# Patient Record
Sex: FEMALE | Race: WHITE | NOT HISPANIC OR LATINO | Employment: FULL TIME | URBAN - METROPOLITAN AREA
[De-identification: names, ages, dates, MRNs, and addresses within clinical notes are randomized per-mention and may not be internally consistent; named-entity substitution may affect disease eponyms.]

---

## 2017-05-25 DIAGNOSIS — Z01.812 PRE-PROCEDURAL LABORATORY EXAMINATION: ICD-10-CM

## 2017-05-25 LAB — HCG SERPL QL: NEGATIVE

## 2017-05-25 PROCEDURE — 84703 CHORIONIC GONADOTROPIN ASSAY: CPT

## 2017-05-25 PROCEDURE — 36415 COLL VENOUS BLD VENIPUNCTURE: CPT

## 2017-05-26 ENCOUNTER — HOSPITAL ENCOUNTER (OUTPATIENT)
Facility: MEDICAL CENTER | Age: 27
End: 2017-05-26
Attending: ORTHOPAEDIC SURGERY | Admitting: ORTHOPAEDIC SURGERY
Payer: COMMERCIAL

## 2017-05-26 VITALS
OXYGEN SATURATION: 98 % | HEIGHT: 62 IN | SYSTOLIC BLOOD PRESSURE: 119 MMHG | WEIGHT: 161.6 LBS | TEMPERATURE: 98.3 F | HEART RATE: 67 BPM | DIASTOLIC BLOOD PRESSURE: 67 MMHG | RESPIRATION RATE: 16 BRPM | BODY MASS INDEX: 29.74 KG/M2

## 2017-05-26 PROBLEM — G56.01 CARPAL TUNNEL SYNDROME ON RIGHT: Status: ACTIVE | Noted: 2017-05-26

## 2017-05-26 PROCEDURE — 160035 HCHG PACU - 1ST 60 MINS PHASE I: Performed by: ORTHOPAEDIC SURGERY

## 2017-05-26 PROCEDURE — 160009 HCHG ANES TIME/MIN: Performed by: ORTHOPAEDIC SURGERY

## 2017-05-26 PROCEDURE — 160036 HCHG PACU - EA ADDL 30 MINS PHASE I: Performed by: ORTHOPAEDIC SURGERY

## 2017-05-26 PROCEDURE — 500881 HCHG PACK, EXTREMITY: Performed by: ORTHOPAEDIC SURGERY

## 2017-05-26 PROCEDURE — A9270 NON-COVERED ITEM OR SERVICE: HCPCS

## 2017-05-26 PROCEDURE — 500761 HCHG KIT, CARPAL TUNNEL ENDOSCOPIC: Performed by: ORTHOPAEDIC SURGERY

## 2017-05-26 PROCEDURE — 501838 HCHG SUTURE GENERAL: Performed by: ORTHOPAEDIC SURGERY

## 2017-05-26 PROCEDURE — 160029 HCHG SURGERY MINUTES - 1ST 30 MINS LEVEL 4: Performed by: ORTHOPAEDIC SURGERY

## 2017-05-26 PROCEDURE — 501480 HCHG STOCKINETTE: Performed by: ORTHOPAEDIC SURGERY

## 2017-05-26 PROCEDURE — 160002 HCHG RECOVERY MINUTES (STAT): Performed by: ORTHOPAEDIC SURGERY

## 2017-05-26 PROCEDURE — 160041 HCHG SURGERY MINUTES - EA ADDL 1 MIN LEVEL 4: Performed by: ORTHOPAEDIC SURGERY

## 2017-05-26 PROCEDURE — 160048 HCHG OR STATISTICAL LEVEL 1-5: Performed by: ORTHOPAEDIC SURGERY

## 2017-05-26 PROCEDURE — 700102 HCHG RX REV CODE 250 W/ 637 OVERRIDE(OP)

## 2017-05-26 PROCEDURE — 700111 HCHG RX REV CODE 636 W/ 250 OVERRIDE (IP)

## 2017-05-26 PROCEDURE — 502240 HCHG MISC OR SUPPLY RC 0272: Performed by: ORTHOPAEDIC SURGERY

## 2017-05-26 PROCEDURE — 700101 HCHG RX REV CODE 250

## 2017-05-26 RX ORDER — OXYCODONE HCL 5 MG/5 ML
SOLUTION, ORAL ORAL
Status: COMPLETED
Start: 2017-05-26 | End: 2017-05-26

## 2017-05-26 RX ORDER — HALOPERIDOL 5 MG/ML
1 INJECTION INTRAMUSCULAR EVERY 6 HOURS PRN
Status: DISCONTINUED | OUTPATIENT
Start: 2017-05-26 | End: 2017-05-26 | Stop reason: HOSPADM

## 2017-05-26 RX ORDER — BUPIVACAINE HYDROCHLORIDE AND EPINEPHRINE 2.5; 5 MG/ML; UG/ML
INJECTION, SOLUTION EPIDURAL; INFILTRATION; INTRACAUDAL; PERINEURAL
Status: DISCONTINUED | OUTPATIENT
Start: 2017-05-26 | End: 2017-05-26 | Stop reason: HOSPADM

## 2017-05-26 RX ORDER — LIDOCAINE HYDROCHLORIDE 10 MG/ML
0.5 INJECTION, SOLUTION INFILTRATION; PERINEURAL
Status: DISCONTINUED | OUTPATIENT
Start: 2017-05-26 | End: 2017-05-26 | Stop reason: HOSPADM

## 2017-05-26 RX ORDER — ONDANSETRON 2 MG/ML
4 INJECTION INTRAMUSCULAR; INTRAVENOUS EVERY 4 HOURS PRN
Status: DISCONTINUED | OUTPATIENT
Start: 2017-05-26 | End: 2017-05-26 | Stop reason: HOSPADM

## 2017-05-26 RX ORDER — DEXAMETHASONE SODIUM PHOSPHATE 4 MG/ML
4 INJECTION, SOLUTION INTRA-ARTICULAR; INTRALESIONAL; INTRAMUSCULAR; INTRAVENOUS; SOFT TISSUE
Status: DISCONTINUED | OUTPATIENT
Start: 2017-05-26 | End: 2017-05-26 | Stop reason: HOSPADM

## 2017-05-26 RX ORDER — LIDOCAINE AND PRILOCAINE 25; 25 MG/G; MG/G
1 CREAM TOPICAL
Status: DISCONTINUED | OUTPATIENT
Start: 2017-05-26 | End: 2017-05-26 | Stop reason: HOSPADM

## 2017-05-26 RX ORDER — SCOLOPAMINE TRANSDERMAL SYSTEM 1 MG/1
1 PATCH, EXTENDED RELEASE TRANSDERMAL
Status: DISCONTINUED | OUTPATIENT
Start: 2017-05-26 | End: 2017-05-26 | Stop reason: HOSPADM

## 2017-05-26 RX ORDER — DIPHENHYDRAMINE HYDROCHLORIDE 50 MG/ML
25 INJECTION INTRAMUSCULAR; INTRAVENOUS EVERY 6 HOURS PRN
Status: DISCONTINUED | OUTPATIENT
Start: 2017-05-26 | End: 2017-05-26 | Stop reason: HOSPADM

## 2017-05-26 RX ORDER — BUPIVACAINE HYDROCHLORIDE AND EPINEPHRINE 2.5; 5 MG/ML; UG/ML
INJECTION, SOLUTION EPIDURAL; INFILTRATION; INTRACAUDAL; PERINEURAL
Status: DISCONTINUED
Start: 2017-05-26 | End: 2017-05-26 | Stop reason: HOSPADM

## 2017-05-26 RX ORDER — SODIUM CHLORIDE, SODIUM LACTATE, POTASSIUM CHLORIDE, CALCIUM CHLORIDE 600; 310; 30; 20 MG/100ML; MG/100ML; MG/100ML; MG/100ML
INJECTION, SOLUTION INTRAVENOUS CONTINUOUS
Status: DISCONTINUED | OUTPATIENT
Start: 2017-05-26 | End: 2017-05-26 | Stop reason: HOSPADM

## 2017-05-26 RX ADMIN — SODIUM CHLORIDE, SODIUM LACTATE, POTASSIUM CHLORIDE, CALCIUM CHLORIDE: 600; 310; 30; 20 INJECTION, SOLUTION INTRAVENOUS at 11:30

## 2017-05-26 RX ADMIN — OXYCODONE HYDROCHLORIDE 10 MG: 5 SOLUTION ORAL at 15:05

## 2017-05-26 ASSESSMENT — PAIN SCALES - GENERAL
PAINLEVEL_OUTOF10: 0
PAINLEVEL_OUTOF10: 2
PAINLEVEL_OUTOF10: 0
PAINLEVEL_OUTOF10: 0
PAINLEVEL_OUTOF10: 2
PAINLEVEL_OUTOF10: 0
PAINLEVEL_OUTOF10: 0

## 2017-05-26 NOTE — OR SURGEON
Immediate Post-Operative Note      PreOp Diagnosis: bilat  cts    PostOp Diagnosis: same    Procedure(s):  CARPAL TUNNEL ENDOSCOPIC - Wound Class: Clean    Surgeon(s):  Keron Sykes M.D.    Anesthesiologist/Type of Anesthesia:  Anesthesiologist: Parminder Bonner M.D./General    Surgical Staff:  Circulator: Lissette Perkins R.N.  Scrub Person: Yesenia Davis    Specimen: 0    Estimated Blood Loss: 0    Findings: 0    Complications: 0        5/26/2017 1:59 PM Keron Sykes

## 2017-05-26 NOTE — IP AVS SNAPSHOT
5/26/2017    Ruddy Cuellar  [address] [facility] [address]    Dear Ruddy:    [facility] wants to ensure your discharge home is safe and you or your loved ones have had all of your questions answered regarding your care after you leave the hospital.    Below is a list of resources and contact information should you have any questions regarding your hospital stay, follow-up instructions, or active medical symptoms.    Questions or Concerns Regarding… Contact   Medical Questions Related to Your Discharge  (7 days a week, 8am-5pm) Contact a Nurse Care Coordinator   [phone]   Medical Questions Not Related to Your Discharge  (24 hours a day / 7 days a week)  Contact the Nurse Health Line   [phone]    Medications or Discharge Instructions Refer to your discharge packet   or contact your [facility] Primary Care Provider   [phone]   Follow-up Appointment(s) Schedule your appointment via [employer]   or contact Scheduling [phone]   Billing Review your statement via [employer]  or contact Billing [phone]   Medical Records Review your records via [employer]   or contact Medical Records [phone]     You may receive a telephone call within two days of discharge. This call is to make certain you understand your discharge instructions and have the opportunity to have any questions answered. You can also easily access your medical information, test results and upcoming appointments via the [employer] free online health management tool. You can learn more and sign up at [employer]. For assistance setting up your [employer] account, please call [phone].    Once again, we want to ensure your discharge home is safe and that you have a clear understanding of any next steps in your care. If you have any questions or concerns, please do not hesitate to contact us, we are here for you. Thank you for choosing [facility] for your healthcare needs.    Sincerely,    Your [facility] Healthcare Team

## 2017-05-26 NOTE — OR NURSING
1400 Received pt from the OR with Dr Bonner, LMA in place, both right and left wrist in soft cast, elevated on pillows and ice in place.      1401 LMA D/C'd, VSS, in no signs of distress. Pt aware of POC.    1430 Pt resting, VSS, in no signs of distress. Pt states arms are numb, fingers bilaterally are warm and pink.    1500 Pt resting, taking sips of water, aunt called and updated on estimated time of departure    1505 Pt complains of mild pain, medicated.    1530 Pt resting, comfortable, VSS< in no signs of distress.    1535 Report off to VIDAL Juarez. Dressings to arms bilaterally remain CDI.

## 2017-05-26 NOTE — OR NURSING
1535 report from Debbie RN, plan of care discussed.    1553 pt dressed and resting in recliner.    1630 aunt at bedside, discharge instructions given- all questions and concerns addressed.    1640 pt discharged out to car in , aunt at side.

## 2017-05-26 NOTE — DISCHARGE INSTRUCTIONS
ACTIVITY: Rest and take it easy for the first 24 hours.  A responsible adult is recommended to remain with you during that time.  It is normal to feel sleepy.  We encourage you to not do anything that requires balance, judgment or coordination.    MILD FLU-LIKE SYMPTOMS ARE NORMAL. YOU MAY EXPERIENCE GENERALIZED MUSCLE ACHES, THROAT IRRITATION, HEADACHE AND/OR SOME NAUSEA.    FOR 24 HOURS DO NOT:  Drive, operate machinery or run household appliances.  Drink beer or alcoholic beverages.   Make important decisions or sign legal documents.    SPECIAL INSTRUCTIONS: *SEE INSTRUCTION SHEET, CALL THE DOCTOR FOR ANY CONCERNS**    DIET: To avoid nausea, slowly advance diet as tolerated, avoiding spicy or greasy foods for the first day.  Add more substantial food to your diet according to your physician's instructions.  Babies can be fed formula or breast milk as soon as they are hungry.  INCREASE FLUIDS AND FIBER TO AVOID CONSTIPATION.    SURGICAL DRESSING/BATHING: *MAY SHOWER OR BATHE TOMORROW. KEEP DRESSINGS CLEAN AND DRY**    FOLLOW-UP APPOINTMENT:  A follow-up appointment should be arranged with your doctor in *2 WEEKS**; call to schedule.    You should CALL YOUR PHYSICIAN if you develop:  Fever greater than 101 degrees F.  Pain not relieved by medication, or persistent nausea or vomiting.  Excessive bleeding (blood soaking through dressing) or unexpected drainage from the wound.  Extreme redness or swelling around the incision site, drainage of pus or foul smelling drainage.  Inability to urinate or empty your bladder within 8 hours.  Problems with breathing or chest pain.    You should call 911 if you develop problems with breathing or chest pain.  If you are unable to contact your doctor or surgical center, you should go to the nearest emergency room or urgent care center.  Physician's telephone #: **DR ALEJANDRO [phone]*    If any questions arise, call your doctor.  If your doctor is not available, please feel  free to call the Surgical Center at [phone].  The Center is open Monday through Friday from 7AM to 7PM.  You can also call the HEALTH HOTLINE open 24 hours/day, 7 days/week and speak to a nurse at [phone], or toll free at [phone].    A registered nurse may call you a few days after your surgery to see how you are doing after your procedure.    MEDICATIONS: Resume taking daily medication.  Take prescribed pain medication with food.  If no medication is prescribed, you may take non-aspirin pain medication if needed.  PAIN MEDICATION CAN BE VERY CONSTIPATING.  Take a stool softener or laxative such as senokot, pericolace, or milk of magnesia if needed.    Prescription given for *PERCOCET**.  Last pain medication given at *3:05pm**.    If your physician has prescribed pain medication that includes Acetaminophen (Tylenol), do not take additional Acetaminophen (Tylenol) while taking the prescribed medication.    Depression / Suicide Risk    As you are discharged from this [facility] facility, it is important to learn how to keep safe from harming yourself.    Recognize the warning signs:  · Abrupt changes in personality, positive or negative- including increase in energy   · Giving away possessions  · Change in eating patterns- significant weight changes-  positive or negative  · Change in sleeping patterns- unable to sleep or sleeping all the time   · Unwillingness or inability to communicate  · Depression  · Unusual sadness, discouragement and loneliness  · Talk of wanting to die  · Neglect of personal appearance   · Rebelliousness- reckless behavior  · Withdrawal from people/activities they love  · Confusion- inability to concentrate     If you or a loved one observes any of these behaviors or has concerns about self-harm, here's what you can do:  · Talk about it- your feelings and reasons for harming yourself  · Remove any means that you might use to hurt yourself (examples: pills, rope, extension  cords, firearm)  · Get professional help from the community (Mental Health, Substance Abuse, psychological counseling)  · Do not be alone:Call your Safe Contact- someone whom you trust who will be there for you.  · Call your local CRISIS HOTLINE 596-1568 or 950-841-0163  · Call your local Children's Mobile Crisis Response Team [place] (985) 529-7185 or www.MoonClerk  · Call the toll free National Suicide Prevention Hotlines   · National Suicide Prevention Lifeline 009-620-OJIN (8540)  · National Hope Line Network 800-SUICIDE (327-2523)

## 2017-05-26 NOTE — IP AVS SNAPSHOT
" Home Care Instructions                                                                                                                Name:Ruddy Cuellar  Medical Record Number:[id number]  CSN: [id number]    YOB: 1990   Age: 27 y.o.  Sex: female  HT:1.575 m (5' 2\") WT: 73.3 kg (161 lb 9.6 oz)          Admit Date: 5/26/2017     Discharge Date:   Today's Date: 5/26/2017  Attending Doctor:  Keron Sykes M.D.                  Allergies:  Review of patient's allergies indicates no known allergies.                Discharge Instructions         ACTIVITY: Rest and take it easy for the first 24 hours.  A responsible adult is recommended to remain with you during that time.  It is normal to feel sleepy.  We encourage you to not do anything that requires balance, judgment or coordination.    MILD FLU-LIKE SYMPTOMS ARE NORMAL. YOU MAY EXPERIENCE GENERALIZED MUSCLE ACHES, THROAT IRRITATION, HEADACHE AND/OR SOME NAUSEA.    FOR 24 HOURS DO NOT:  Drive, operate machinery or run household appliances.  Drink beer or alcoholic beverages.   Make important decisions or sign legal documents.    SPECIAL INSTRUCTIONS: *SEE INSTRUCTION SHEET, CALL THE DOCTOR FOR ANY CONCERNS**    DIET: To avoid nausea, slowly advance diet as tolerated, avoiding spicy or greasy foods for the first day.  Add more substantial food to your diet according to your physician's instructions.  Babies can be fed formula or breast milk as soon as they are hungry.  INCREASE FLUIDS AND FIBER TO AVOID CONSTIPATION.    SURGICAL DRESSING/BATHING: *MAY SHOWER OR BATHE TOMORROW. KEEP DRESSINGS CLEAN AND DRY**    FOLLOW-UP APPOINTMENT:  A follow-up appointment should be arranged with your doctor in *2 WEEKS**; call to schedule.    You should CALL YOUR PHYSICIAN if you develop:  Fever greater than 101 degrees F.  Pain not relieved by medication, or persistent nausea or vomiting.  Excessive bleeding (blood soaking through dressing) or unexpected drainage from " the wound.  Extreme redness or swelling around the incision site, drainage of pus or foul smelling drainage.  Inability to urinate or empty your bladder within 8 hours.  Problems with breathing or chest pain.    You should call 911 if you develop problems with breathing or chest pain.  If you are unable to contact your doctor or surgical center, you should go to the nearest emergency room or urgent care center.  Physician's telephone #: **DR ALEJANDRO [phone]*    If any questions arise, call your doctor.  If your doctor is not available, please feel free to call the Surgical Center at [phone].  The Center is open Monday through Friday from 7AM to 7PM.  You can also call the HEALTH HOTLINE open 24 hours/day, 7 days/week and speak to a nurse at [phone], or toll free at [phone].    A registered nurse may call you a few days after your surgery to see how you are doing after your procedure.    MEDICATIONS: Resume taking daily medication.  Take prescribed pain medication with food.  If no medication is prescribed, you may take non-aspirin pain medication if needed.  PAIN MEDICATION CAN BE VERY CONSTIPATING.  Take a stool softener or laxative such as senokot, pericolace, or milk of magnesia if needed.    Prescription given for *PERCOCET**.  Last pain medication given at *3:05pm**.    If your physician has prescribed pain medication that includes Acetaminophen (Tylenol), do not take additional Acetaminophen (Tylenol) while taking the prescribed medication.    Depression / Suicide Risk    As you are discharged from this [facility] facility, it is important to learn how to keep safe from harming yourself.    Recognize the warning signs:  · Abrupt changes in personality, positive or negative- including increase in energy   · Giving away possessions  · Change in eating patterns- significant weight changes-  positive or negative  · Change in sleeping patterns- unable to sleep or sleeping all the  time   · Unwillingness or inability to communicate  · Depression  · Unusual sadness, discouragement and loneliness  · Talk of wanting to die  · Neglect of personal appearance   · Rebelliousness- reckless behavior  · Withdrawal from people/activities they love  · Confusion- inability to concentrate     If you or a loved one observes any of these behaviors or has concerns about self-harm, here's what you can do:  · Talk about it- your feelings and reasons for harming yourself  · Remove any means that you might use to hurt yourself (examples: pills, rope, extension cords, firearm)  · Get professional help from the community (Mental Health, Substance Abuse, psychological counseling)  · Do not be alone:Call your Safe Contact- someone whom you trust who will be there for you.  · Call your local CRISIS HOTLINE 220-6372 or 030-003-8179  · Call your local Children's Mobile Crisis Response Team [place] (224) 156-9089 or www.SurgiLight  · Call the toll free National Suicide Prevention Hotlines   · National Suicide Prevention Lifeline 262-888-TXSH (5403)  · National Hope Line Network 800-SUICIDE (330-3641)       Medication List      Notice     You have not been prescribed any medications.            Medication Information     Above and/or attached are the medications your physician expects you to take upon discharge. Review all of your home medications and newly ordered medications with your doctor and/or pharmacist. Follow medication instructions as directed by your doctor and/or pharmacist. Please keep your medication list with you and share with your physician. Update the information when medications are discontinued, doses are changed, or new medications (including over-the-counter products) are added; and carry medication information at all times in the event of emergency situations.        Resources     Quit Smoking / Tobacco Use:    I understand the use of any tobacco products increases my chance of suffering  from future heart disease or stroke and could cause other illnesses which may shorten my life. Quitting the use of tobacco products is the single most important thing I can do to improve my health. For further information on smoking / tobacco cessation call a Toll Free Quit Line at 1-329.221.4549 (*National Cancer [place]) or 1-787.508.2148 (American Lung Association) or you can access the web based program at www.lungusa.org.    [place] Tobacco Users Help Line:  (485) 146-8817       Toll Free: 1-179.962.2437  Quit Tobacco Program [facility] (118)349-6022    Crisis Hotline:    [place] Crisis Hotline:  5-580-HDDWXWE or 1-857.920.5410    [place] Crisis Hotline:    1-249.867.7334 or 961-381-4534    Discharge Survey:   Thank you for choosing [facility]. We hope we did everything we could to make your hospital stay a pleasant one. You may be receiving a survey and we would appreciate your time and participation in answering the questions. Your input is very valuable to us in our efforts to improve our service to our patients and their families.            Signatures     My signature on this form indicates that:    1. I acknowledge receipt and understanding of these Home Care Instruction.  2. My questions regarding this information have been answered to my satisfaction.  3. I have formulated a plan with my discharge nurse to obtain my prescribed medications for home.    __________________________________      __________________________________                   Patient Signature                                 Guardian/Responsible Adult Signature      __________________________________                 __________       ________                       Nurse Signature                                               Date                 Time

## 2017-05-30 NOTE — OP REPORT
DATE OF SERVICE:  05/26/2017    PREOPERATIVE DIAGNOSIS:  Bilateral carpal tunnel syndrome.    POSTOPERATIVE DIAGNOSIS:  Bilateral carpal tunnel syndrome.    PROCEDURES PERFORMED:  Left endoscopic carpal tunnel release, right endoscopic   carpal tunnel release.    ANESTHESIA:  General.    SURGEON:  Keron Sykes MD    OPERATIVE PROCEDURE:  Patient taken to the operating room following induction   of general anesthesia, left upper extremity was prepped and draped in routine   fashion.  Limb was exsanguinated with an elastic bandage.  Tourniquet inflated   to 250 mmHg.  Using the Chow technique, the proximal was opened and   alternating sharp and blunt dissection carried down through the skin and   subcutaneous tissue.  Deep fascia was opened, carpal tunnel was probed, sheath   and trocar were inserted, extended to the palm of the hand.  The endoscope   was introduced and transverse carpal ligament was carefully visualized,   carefully probed, carefully released in a inamye-fr-pluykajr fashion staying   on the ulnar aspect of the canal and getting good release of the ligament.    The instruments were removed.  Tourniquet released.  Wound irrigated   copiously.  Skin closed with 4-0 nylon.  Compressive dressing and splint   applied.  The right upper extremity was then prepped and draped in routine   fashion.  Limb was exsanguinated with an elastic bandage.  The tourniquet   inflated to 250 mmHg.  Using the Chow technique, the proximal was opened, and   alternating sharp and blunt dissection carried down through the skin and   subcutaneous tissue.  Deep fascia was opened.  Carpal tunnel was probed.    Sheath and trocar were inserted, extended to the palm of the hand.  The   endoscope was introduced and transverse carpal ligament was carefully   visualized, carefully probed, carefully released in a rqlcst-ev-eyopupkv   fashion staying on the ulnar aspect of the canal, getting good release of the   ligament.  The  instruments were removed.  Tourniquet released.  Wound   irrigated copiously.  Skin edges infiltrated with 0.5% Marcaine.  Skin closed   with 4-0 nylon.  Compressive dressing and splint applied.  Both arms were   elevated.  Patient was taken to the recovery room in satisfactory condition.       ____________________________________     MD FLORIAN COLÓN / CED    DD:  05/30/2017 12:33:56  DT:  05/30/2017 15:54:33    D#:  [id number]  Job#:  [id number]

## 2018-12-25 ENCOUNTER — HOSPITAL ENCOUNTER (EMERGENCY)
Facility: MEDICAL CENTER | Age: 28
End: 2018-12-25
Attending: EMERGENCY MEDICINE
Payer: MEDICAID

## 2018-12-25 VITALS
TEMPERATURE: 97.4 F | SYSTOLIC BLOOD PRESSURE: 121 MMHG | RESPIRATION RATE: 18 BRPM | HEIGHT: 62 IN | DIASTOLIC BLOOD PRESSURE: 72 MMHG | HEART RATE: 97 BPM | OXYGEN SATURATION: 99 % | BODY MASS INDEX: 31.6 KG/M2 | WEIGHT: 171.74 LBS

## 2018-12-25 DIAGNOSIS — J40 BRONCHITIS: ICD-10-CM

## 2018-12-25 DIAGNOSIS — J01.90 ACUTE NON-RECURRENT SINUSITIS, UNSPECIFIED LOCATION: ICD-10-CM

## 2018-12-25 PROCEDURE — 99283 EMERGENCY DEPT VISIT LOW MDM: CPT

## 2018-12-25 RX ORDER — AZITHROMYCIN 250 MG/1
TABLET, FILM COATED ORAL
Qty: 6 TAB | Refills: 0 | Status: ON HOLD | OUTPATIENT
Start: 2018-12-25 | End: 2019-05-26

## 2018-12-25 ASSESSMENT — PAIN SCALES - GENERAL: PAINLEVEL_OUTOF10: 4

## 2018-12-25 NOTE — ED PROVIDER NOTES
ED Provider Note    Scribed for Laine Perez M.D. by Efren Barlow. 12/25/2018  12:28 PM    Primary care provider: Pcp Pt States None  Means of arrival: walk in  History obtained from: patient  History limited by: none    CHIEF COMPLAINT  Chief Complaint   Patient presents with   • Sore Throat     x2 days    • Cough   • Pregnancy     18 weeks, N/V x2 days        HPI  Ruddy Cuellar is a 28 y.o. female who presents to the Emergency Department complaining of persistent sore throat for the last 2 days. Patient reports associated cough, nausea, vomiting. She is currently 18 weeks pregnant. Patient states that her symptoms have not improved and presents today for evaluation. Patient denies shortness of breath, chest pain, fever, history of asthma, cigarette use.     REVIEW OF SYSTEMS  HEENT: Sore throat, No ear pain,  EYES: no discharge, redness, or vision changes  CARDIAC: no chest pain, no palpitations    PULMONARY: Cough, sputum production. no dyspnea,   GI: Nausea, vomiting, No diarrhea, or abdominal pain   : no dysuria, back pain, or hematuria   Neuro: no weakness, numbness, aphasia, or headache  Musculoskeletal: no swelling, deformity, pain, or joint swelling  Endocrine: no fevers, sweating, or weight loss   SKIN: no rash, erythema, or contusions     See history of present illness. All other systems are negative. C.    PAST MEDICAL HISTORY   has a past medical history of Substance abuse (HCC).    SURGICAL HISTORY   has a past surgical history that includes appendectomy (2004) and carpal tunnel endoscopic (Bilateral, 5/26/2017).    SOCIAL HISTORY  Social History   Substance Use Topics   • Smoking status: Never Smoker   • Smokeless tobacco: Never Used   • Alcohol use No      History   Drug Use No     Comment: Meth use IV 2 years ago, no drug use presently.        FAMILY HISTORY  History reviewed. No pertinent family history.    CURRENT MEDICATIONS  Home Medications     Reviewed by Avtar Conway R.N.  "(Registered Nurse) on 12/25/18 at 1131  Med List Status: Partial   Medication Last Dose Status        Patient Ihsan Taking any Medications                       ALLERGIES  No Known Allergies    PHYSICAL EXAM  VITAL SIGNS: /72   Pulse 97   Temp 36.3 °C (97.4 °F) (Temporal)   Resp 18   Ht 1.575 m (5' 2\")   Wt 77.9 kg (171 lb 11.8 oz)   SpO2 99%   BMI 31.41 kg/m²     Constitutional: Well developed, Well nourished, Mild distress, Non-toxic appearance.   HEENT: Normocephalic, Atraumatic,  external ears normal, pharynx pink,  Mucous  Membranes moist, Rhinorrhea or mucosal edema. Mild maxillary sinus tenderness. Fluid behind bilateral TMs  Eyes: PERRL, EOMI, Conjunctiva normal, No discharge.   Neck: Normal range of motion, No tenderness, Supple, No stridor.   Lymphatic: No lymphadenopathy    Cardiovascular: Regular Rate and Rhythm, No murmurs,  rubs, or gallops.   Thorax & Lungs: Lungs clear to auscultation bilaterally, No respiratory distress, No wheezes, rhales or rhonchi, No chest wall tenderness.   Abdomen: Bowel sounds normal, Soft, non tender, non distended,  No pulsatile masses., no rebound guarding or peritoneal signs.   Skin: Warm, Dry, No erythema, No rash,   Back:  No CVA tenderness,  No spinal tenderness, bony crepitance, step offs, or instability.   Neurologic: Alert & oriented x 3, Normal motor function, Normal sensory function, No focal deficits noted. Normal reflexes. Normal Cranial Nerves.  Extremities: Equal, intact distal pulses, No cyanosis, clubbing or edema,  No tenderness.   Musculoskeletal: Good range of motion in all major joints. No tenderness to palpation or major deformities noted.     DIAGNOSTIC STUDIES / PROCEDURES    COURSE & MEDICAL DECISION MAKING  Nursing notes, VS, PMSFHx reviewed in chart.    12:28 PM Patient seen and examined at bedside.     The patient presents today with signs and symptoms consistent with sinusitis, bronchitis. They have a normal pulse oximetry on room " air and a normal pulmonary exam. Therefore, I feel that the likelihood of pneumonia is low. This patient does not demonstrate any clinical evidence of pneumonia, meningitis, appendicitis, or other acute medical emergency. Overall, the patient is very well appearing. She will be prescribed Zithromax for discharge home. I have recommended Tylenol and/or ibuprofen for fever. I instructed the patient to maintain adequate hydration throughout the course of the illness. Patient is instructed to return with any new or worsening symptoms.     The patient will return for new or worsening symptoms and is stable at the time of discharge.    The patient is referred to a primary physician for blood pressure management, diabetic screening, and for all other preventative health concerns.    DISPOSITION:  Patient will be discharged home in stable condition.    FOLLOW UP:  [facility]  [address] [place] [id number]  [phone]      As needed, If symptoms worsen      OUTPATIENT MEDICATIONS:  Discharge Medication List as of 12/25/2018 12:32 PM      START taking these medications    Details   azithromycin (ZITHROMAX) 250 MG Tab Take two tabs by mouth on day one, then one tab by mouth daily on days 2-5., Disp-6 Tab, R-0, Print Rx Paper           FINAL IMPRESSION  1. Bronchitis    2. Acute non-recurrent sinusitis, unspecified location          IEfren (Jovanibstephen), am scribing for, and in the presence of, Laine Perez M.D..    Electronically signed by: Efren Barlow (Michael), 12/25/2018    ILaine M.D. personally performed the services described in this documentation, as scribed by Efren Barlow in my presence, and it is both accurate and complete. C    The note accurately reflects work and decisions made by me.  Laine Perez  12/25/2018  2:40 PM

## 2018-12-25 NOTE — ED TRIAGE NOTES
"Chief Complaint   Patient presents with   • Sore Throat     x2 days    • Cough   • Pregnancy     18 weeks, N/V x2 days      Pt to triage for above. NAD noted.    Pt returned to lobby. Educated on triage process and to inform staff of any changes.     Pulse 97   Temp 36.3 °C (97.4 °F) (Temporal)   Resp 18   Ht 1.575 m (5' 2\")   Wt 77.9 kg (171 lb 11.8 oz)   SpO2 99%   BMI 31.41 kg/m²     "

## 2018-12-25 NOTE — ED NOTES
Pt prepared for discharged. Reviewed all discharge instructions/prescription and verbalized agreement with plan. No further questions.

## 2018-12-25 NOTE — ED NOTES
"Patient states she does NOT have nausea, it is \"when she is coughing and feels like she is going to throw up from coughing\" when RN asked why she has not taken her nausea medication at home.   "

## 2018-12-26 ENCOUNTER — PATIENT OUTREACH (OUTPATIENT)
Dept: HEALTH INFORMATION MANAGEMENT | Facility: OTHER | Age: 28
End: 2018-12-26

## 2019-05-26 ENCOUNTER — HOSPITAL ENCOUNTER (INPATIENT)
Facility: MEDICAL CENTER | Age: 29
LOS: 2 days | End: 2019-05-28
Admitting: FAMILY MEDICINE
Payer: MEDICAID

## 2019-05-26 ENCOUNTER — ANESTHESIA EVENT (OUTPATIENT)
Dept: OBGYN | Facility: MEDICAL CENTER | Age: 29
End: 2019-05-26
Payer: MEDICAID

## 2019-05-26 ENCOUNTER — ANESTHESIA (OUTPATIENT)
Dept: OBGYN | Facility: MEDICAL CENTER | Age: 29
End: 2019-05-26
Payer: MEDICAID

## 2019-05-26 LAB
APPEARANCE UR: CLEAR
BASOPHILS # BLD AUTO: 0.3 % (ref 0–1.8)
BASOPHILS # BLD: 0.04 K/UL (ref 0–0.12)
COLOR UR AUTO: YELLOW
EOSINOPHIL # BLD AUTO: 0.03 K/UL (ref 0–0.51)
EOSINOPHIL NFR BLD: 0.2 % (ref 0–6.9)
ERYTHROCYTE [DISTWIDTH] IN BLOOD BY AUTOMATED COUNT: 42.7 FL (ref 35.9–50)
GLUCOSE UR QL STRIP.AUTO: NEGATIVE MG/DL
HCT VFR BLD AUTO: 42.1 % (ref 37–47)
HGB BLD-MCNC: 13.5 G/DL (ref 12–16)
HOLDING TUBE BB 8507: NORMAL
IMM GRANULOCYTES # BLD AUTO: 0.07 K/UL (ref 0–0.11)
IMM GRANULOCYTES NFR BLD AUTO: 0.5 % (ref 0–0.9)
KETONES UR QL STRIP.AUTO: NEGATIVE MG/DL
LEUKOCYTE ESTERASE UR QL STRIP.AUTO: ABNORMAL
LYMPHOCYTES # BLD AUTO: 1.46 K/UL (ref 1–4.8)
LYMPHOCYTES NFR BLD: 9.6 % (ref 22–41)
MCH RBC QN AUTO: 28 PG (ref 27–33)
MCHC RBC AUTO-ENTMCNC: 32.1 G/DL (ref 33.6–35)
MCV RBC AUTO: 87.3 FL (ref 81.4–97.8)
MONOCYTES # BLD AUTO: 0.6 K/UL (ref 0–0.85)
MONOCYTES NFR BLD AUTO: 4 % (ref 0–13.4)
NEUTROPHILS # BLD AUTO: 12.97 K/UL (ref 2–7.15)
NEUTROPHILS NFR BLD: 85.4 % (ref 44–72)
NITRITE UR QL STRIP.AUTO: NEGATIVE
NRBC # BLD AUTO: 0 K/UL
NRBC BLD-RTO: 0 /100 WBC
PH UR STRIP.AUTO: 6.5 [PH]
PLATELET # BLD AUTO: 196 K/UL (ref 164–446)
PMV BLD AUTO: 11.9 FL (ref 9–12.9)
PROT UR QL STRIP: NEGATIVE MG/DL
RBC # BLD AUTO: 4.82 M/UL (ref 4.2–5.4)
RBC UR QL AUTO: ABNORMAL
SP GR UR: 1.02
WBC # BLD AUTO: 15.2 K/UL (ref 4.8–10.8)

## 2019-05-26 PROCEDURE — 700111 HCHG RX REV CODE 636 W/ 250 OVERRIDE (IP): Performed by: OBSTETRICS & GYNECOLOGY

## 2019-05-26 PROCEDURE — 770002 HCHG ROOM/CARE - OB PRIVATE (112)

## 2019-05-26 PROCEDURE — A9270 NON-COVERED ITEM OR SERVICE: HCPCS | Performed by: STUDENT IN AN ORGANIZED HEALTH CARE EDUCATION/TRAINING PROGRAM

## 2019-05-26 PROCEDURE — 700111 HCHG RX REV CODE 636 W/ 250 OVERRIDE (IP)

## 2019-05-26 PROCEDURE — 85025 COMPLETE CBC W/AUTO DIFF WBC: CPT

## 2019-05-26 PROCEDURE — 700111 HCHG RX REV CODE 636 W/ 250 OVERRIDE (IP): Performed by: STUDENT IN AN ORGANIZED HEALTH CARE EDUCATION/TRAINING PROGRAM

## 2019-05-26 PROCEDURE — 36415 COLL VENOUS BLD VENIPUNCTURE: CPT

## 2019-05-26 PROCEDURE — 59409 OBSTETRICAL CARE: CPT

## 2019-05-26 PROCEDURE — 10D17Z9 MANUAL EXTRACTION OF PRODUCTS OF CONCEPTION, RETAINED, VIA NATURAL OR ARTIFICIAL OPENING: ICD-10-PCS | Performed by: OBSTETRICS & GYNECOLOGY

## 2019-05-26 PROCEDURE — 700105 HCHG RX REV CODE 258: Performed by: ANESTHESIOLOGY

## 2019-05-26 PROCEDURE — 304965 HCHG RECOVERY SERVICES

## 2019-05-26 PROCEDURE — 303615 HCHG EPIDURAL/SPINAL ANESTHESIA FOR LABOR

## 2019-05-26 PROCEDURE — 700102 HCHG RX REV CODE 250 W/ 637 OVERRIDE(OP): Performed by: STUDENT IN AN ORGANIZED HEALTH CARE EDUCATION/TRAINING PROGRAM

## 2019-05-26 PROCEDURE — 700105 HCHG RX REV CODE 258

## 2019-05-26 PROCEDURE — 700111 HCHG RX REV CODE 636 W/ 250 OVERRIDE (IP): Performed by: ANESTHESIOLOGY

## 2019-05-26 PROCEDURE — 700105 HCHG RX REV CODE 258: Performed by: STUDENT IN AN ORGANIZED HEALTH CARE EDUCATION/TRAINING PROGRAM

## 2019-05-26 PROCEDURE — 81002 URINALYSIS NONAUTO W/O SCOPE: CPT

## 2019-05-26 RX ORDER — ROPIVACAINE HYDROCHLORIDE 2 MG/ML
INJECTION, SOLUTION EPIDURAL; INFILTRATION; PERINEURAL CONTINUOUS
Status: DISCONTINUED | OUTPATIENT
Start: 2019-05-26 | End: 2019-05-28 | Stop reason: HOSPADM

## 2019-05-26 RX ORDER — CARBOPROST TROMETHAMINE 250 UG/ML
250 INJECTION, SOLUTION INTRAMUSCULAR
Status: DISCONTINUED | OUTPATIENT
Start: 2019-05-26 | End: 2019-05-26 | Stop reason: HOSPADM

## 2019-05-26 RX ORDER — TERBUTALINE SULFATE 1 MG/ML
INJECTION, SOLUTION SUBCUTANEOUS
Status: COMPLETED
Start: 2019-05-26 | End: 2019-05-26

## 2019-05-26 RX ORDER — ROPIVACAINE HYDROCHLORIDE 2 MG/ML
INJECTION, SOLUTION EPIDURAL; INFILTRATION
Status: DISCONTINUED | OUTPATIENT
Start: 2019-05-26 | End: 2019-05-26 | Stop reason: SURG

## 2019-05-26 RX ORDER — SODIUM CHLORIDE, SODIUM LACTATE, POTASSIUM CHLORIDE, CALCIUM CHLORIDE 600; 310; 30; 20 MG/100ML; MG/100ML; MG/100ML; MG/100ML
INJECTION, SOLUTION INTRAVENOUS PRN
Status: DISCONTINUED | OUTPATIENT
Start: 2019-05-26 | End: 2019-05-28 | Stop reason: HOSPADM

## 2019-05-26 RX ORDER — MISOPROSTOL 200 UG/1
800 TABLET ORAL
Status: COMPLETED | OUTPATIENT
Start: 2019-05-26 | End: 2019-05-26

## 2019-05-26 RX ORDER — SODIUM CHLORIDE, SODIUM LACTATE, POTASSIUM CHLORIDE, AND CALCIUM CHLORIDE .6; .31; .03; .02 G/100ML; G/100ML; G/100ML; G/100ML
250 INJECTION, SOLUTION INTRAVENOUS PRN
Status: DISCONTINUED | OUTPATIENT
Start: 2019-05-26 | End: 2019-05-26 | Stop reason: HOSPADM

## 2019-05-26 RX ORDER — BUPIVACAINE HYDROCHLORIDE 2.5 MG/ML
INJECTION, SOLUTION EPIDURAL; INFILTRATION; INTRACAUDAL PRN
Status: DISCONTINUED | OUTPATIENT
Start: 2019-05-26 | End: 2019-05-26 | Stop reason: SURG

## 2019-05-26 RX ORDER — VITAMIN A ACETATE, BETA CAROTENE, ASCORBIC ACID, CHOLECALCIFEROL, .ALPHA.-TOCOPHEROL ACETATE, DL-, THIAMINE MONONITRATE, RIBOFLAVIN, NIACINAMIDE, PYRIDOXINE HYDROCHLORIDE, FOLIC ACID, CYANOCOBALAMIN, CALCIUM CARBONATE, FERROUS FUMARATE, ZINC OXIDE, CUPRIC OXIDE 3080; 12; 120; 400; 1; 1.84; 3; 20; 22; 920; 25; 200; 27; 10; 2 [IU]/1; UG/1; MG/1; [IU]/1; MG/1; MG/1; MG/1; MG/1; MG/1; [IU]/1; MG/1; MG/1; MG/1; MG/1; MG/1
1 TABLET, FILM COATED ORAL EVERY MORNING
Status: DISCONTINUED | OUTPATIENT
Start: 2019-05-27 | End: 2019-05-28 | Stop reason: HOSPADM

## 2019-05-26 RX ORDER — ONDANSETRON 2 MG/ML
4 INJECTION INTRAMUSCULAR; INTRAVENOUS EVERY 6 HOURS PRN
Status: DISCONTINUED | OUTPATIENT
Start: 2019-05-26 | End: 2019-05-28 | Stop reason: HOSPADM

## 2019-05-26 RX ORDER — METHYLERGONOVINE MALEATE 0.2 MG/ML
0.2 INJECTION INTRAVENOUS
Status: DISCONTINUED | OUTPATIENT
Start: 2019-05-26 | End: 2019-05-26 | Stop reason: HOSPADM

## 2019-05-26 RX ORDER — ROPIVACAINE HYDROCHLORIDE 2 MG/ML
INJECTION, SOLUTION EPIDURAL; INFILTRATION; PERINEURAL
Status: COMPLETED
Start: 2019-05-26 | End: 2019-05-26

## 2019-05-26 RX ORDER — IBUPROFEN 600 MG/1
600 TABLET ORAL EVERY 6 HOURS PRN
Status: DISCONTINUED | OUTPATIENT
Start: 2019-05-26 | End: 2019-05-28 | Stop reason: HOSPADM

## 2019-05-26 RX ORDER — ACETAMINOPHEN 325 MG/1
325 TABLET ORAL EVERY 4 HOURS PRN
Status: DISCONTINUED | OUTPATIENT
Start: 2019-05-26 | End: 2019-05-28 | Stop reason: HOSPADM

## 2019-05-26 RX ORDER — SODIUM CHLORIDE, SODIUM LACTATE, POTASSIUM CHLORIDE, CALCIUM CHLORIDE 600; 310; 30; 20 MG/100ML; MG/100ML; MG/100ML; MG/100ML
INJECTION, SOLUTION INTRAVENOUS
Status: COMPLETED
Start: 2019-05-26 | End: 2019-05-26

## 2019-05-26 RX ORDER — HYDROXYZINE 50 MG/1
50 TABLET, FILM COATED ORAL EVERY 6 HOURS PRN
Status: DISCONTINUED | OUTPATIENT
Start: 2019-05-26 | End: 2019-05-26 | Stop reason: HOSPADM

## 2019-05-26 RX ORDER — MISOPROSTOL 200 UG/1
600 TABLET ORAL
Status: DISCONTINUED | OUTPATIENT
Start: 2019-05-26 | End: 2019-05-28 | Stop reason: HOSPADM

## 2019-05-26 RX ORDER — CEFAZOLIN SODIUM 2 G/100ML
2 INJECTION, SOLUTION INTRAVENOUS EVERY 8 HOURS
Status: COMPLETED | OUTPATIENT
Start: 2019-05-27 | End: 2019-05-27

## 2019-05-26 RX ORDER — BUPIVACAINE HYDROCHLORIDE 2.5 MG/ML
INJECTION, SOLUTION EPIDURAL; INFILTRATION; INTRACAUDAL
Status: COMPLETED
Start: 2019-05-26 | End: 2019-05-26

## 2019-05-26 RX ORDER — BISACODYL 10 MG
10 SUPPOSITORY, RECTAL RECTAL PRN
Status: DISCONTINUED | OUTPATIENT
Start: 2019-05-26 | End: 2019-05-28 | Stop reason: HOSPADM

## 2019-05-26 RX ORDER — HYDROCODONE BITARTRATE AND ACETAMINOPHEN 5; 325 MG/1; MG/1
1 TABLET ORAL EVERY 4 HOURS PRN
Status: DISCONTINUED | OUTPATIENT
Start: 2019-05-26 | End: 2019-05-28 | Stop reason: HOSPADM

## 2019-05-26 RX ORDER — SODIUM CHLORIDE, SODIUM LACTATE, POTASSIUM CHLORIDE, AND CALCIUM CHLORIDE .6; .31; .03; .02 G/100ML; G/100ML; G/100ML; G/100ML
1000 INJECTION, SOLUTION INTRAVENOUS
Status: COMPLETED | OUTPATIENT
Start: 2019-05-26 | End: 2019-05-26

## 2019-05-26 RX ORDER — SODIUM CHLORIDE, SODIUM LACTATE, POTASSIUM CHLORIDE, CALCIUM CHLORIDE 600; 310; 30; 20 MG/100ML; MG/100ML; MG/100ML; MG/100ML
INJECTION, SOLUTION INTRAVENOUS CONTINUOUS
Status: DISPENSED | OUTPATIENT
Start: 2019-05-26 | End: 2019-05-26

## 2019-05-26 RX ORDER — ONDANSETRON 4 MG/1
4 TABLET, ORALLY DISINTEGRATING ORAL EVERY 6 HOURS PRN
Status: DISCONTINUED | OUTPATIENT
Start: 2019-05-26 | End: 2019-05-28 | Stop reason: HOSPADM

## 2019-05-26 RX ORDER — METHYLERGONOVINE MALEATE 0.2 MG/ML
0.2 INJECTION INTRAVENOUS
Status: DISCONTINUED | OUTPATIENT
Start: 2019-05-26 | End: 2019-05-28 | Stop reason: HOSPADM

## 2019-05-26 RX ORDER — DOCUSATE SODIUM 100 MG/1
100 CAPSULE, LIQUID FILLED ORAL 2 TIMES DAILY PRN
Status: DISCONTINUED | OUTPATIENT
Start: 2019-05-26 | End: 2019-05-28 | Stop reason: HOSPADM

## 2019-05-26 RX ADMIN — ROPIVACAINE HYDROCHLORIDE 10 ML/HR: 2 INJECTION, SOLUTION EPIDURAL; INFILTRATION at 13:38

## 2019-05-26 RX ADMIN — ROPIVACAINE HYDROCHLORIDE: 2 INJECTION, SOLUTION EPIDURAL; INFILTRATION; PERINEURAL at 13:42

## 2019-05-26 RX ADMIN — FENTANYL CITRATE 100 MCG: 50 INJECTION INTRAMUSCULAR; INTRAVENOUS at 13:05

## 2019-05-26 RX ADMIN — Medication 20 UNITS: at 16:59

## 2019-05-26 RX ADMIN — ROPIVACAINE HYDROCHLORIDE: 2 INJECTION, SOLUTION EPIDURAL; INFILTRATION at 13:42

## 2019-05-26 RX ADMIN — BUPIVACAINE HYDROCHLORIDE 10 ML: 2.5 INJECTION, SOLUTION EPIDURAL; INFILTRATION; INTRACAUDAL; PERINEURAL at 13:37

## 2019-05-26 RX ADMIN — AMPICILLIN AND SULBACTAM 3 G: 2; 1 INJECTION, POWDER, FOR SOLUTION INTRAVENOUS at 18:12

## 2019-05-26 RX ADMIN — SODIUM CHLORIDE, POTASSIUM CHLORIDE, SODIUM LACTATE AND CALCIUM CHLORIDE: 600; 310; 30; 20 INJECTION, SOLUTION INTRAVENOUS at 14:23

## 2019-05-26 RX ADMIN — SODIUM CHLORIDE, POTASSIUM CHLORIDE, SODIUM LACTATE AND CALCIUM CHLORIDE 1000 ML: 600; 310; 30; 20 INJECTION, SOLUTION INTRAVENOUS at 13:59

## 2019-05-26 RX ADMIN — CEFAZOLIN SODIUM 2 G: 2 INJECTION, SOLUTION INTRAVENOUS at 23:58

## 2019-05-26 RX ADMIN — SODIUM CHLORIDE, POTASSIUM CHLORIDE, SODIUM LACTATE AND CALCIUM CHLORIDE: 600; 310; 30; 20 INJECTION, SOLUTION INTRAVENOUS at 13:06

## 2019-05-26 RX ADMIN — FENTANYL CITRATE 100 MCG: 50 INJECTION INTRAMUSCULAR; INTRAVENOUS at 13:37

## 2019-05-26 RX ADMIN — IBUPROFEN 600 MG: 600 TABLET ORAL at 20:07

## 2019-05-26 RX ADMIN — Medication 125 ML/HR: at 19:11

## 2019-05-26 ASSESSMENT — PATIENT HEALTH QUESTIONNAIRE - PHQ9
1. LITTLE INTEREST OR PLEASURE IN DOING THINGS: NOT AT ALL
2. FEELING DOWN, DEPRESSED, IRRITABLE, OR HOPELESS: NOT AT ALL
SUM OF ALL RESPONSES TO PHQ9 QUESTIONS 1 AND 2: 0

## 2019-05-26 ASSESSMENT — LIFESTYLE VARIABLES
ALCOHOL_USE: NO
EVER_SMOKED: NEVER

## 2019-05-26 ASSESSMENT — COPD QUESTIONNAIRES
HAVE YOU SMOKED AT LEAST 100 CIGARETTES IN YOUR ENTIRE LIFE: NO/DON'T KNOW
DO YOU EVER COUGH UP ANY MUCUS OR PHLEGM?: NO/ONLY WITH OCCASIONAL COLDS OR INFECTIONS
IN THE PAST 12 MONTHS DO YOU DO LESS THAN YOU USED TO BECAUSE OF YOUR BREATHING PROBLEMS: DISAGREE/UNSURE
DURING THE PAST 4 WEEKS HOW MUCH DID YOU FEEL SHORT OF BREATH: NONE/LITTLE OF THE TIME

## 2019-05-26 ASSESSMENT — PAIN SCALES - GENERAL: PAIN_LEVEL: 0

## 2019-05-26 NOTE — PROGRESS NOTES
JOSE  EGA 40.3  +FM, denies LOF or VB.    PT presenting with CO painful UC's Q5 mins since 6am.  SVE 3/thick/high.    1100 Call to JB Sanabria, orders to ambulate and repeat SVE.    1140 Dr. Flores and Daniele in department, PT back in room, breathing through UC's.  /-2    1145 Mark at [facility] US, vertex confirmed.    1159 Report to Isamar Triana.

## 2019-05-26 NOTE — CARE PLAN
Problem: Pain  Goal: Alleviation of Pain or a reduction in pain to the patient's comfort goal  Outcome: PROGRESSING AS EXPECTED  Pt requesting epidural, anesthesia currently unavailable, RN to pass pain medication and will continue to monitor    Problem: Risk for Infection, Impaired Wound Healing  Goal: Remain free from signs and symptoms of infection  Outcome: PROGRESSING AS EXPECTED  Pt remains free from s/s of infection

## 2019-05-26 NOTE — H&P
History and Physical      Ruddy Cuellar is a 29 y.o. year old female  at 40w3d dated by LMP verified by 8 week U/S who presents with painful contractions    PNC with the [facility] midwife Whitley Bryan starting at 6 weeks.  Uncomplicated pregnancy.  Hx of Meth use in , states she has been clean since.  She was diagnosed schizophrenia     Subjective:   positive  For CTXS.   positive Feels pain   negative for LOF  negative for vaginal bleeding.   positive for fetal movement    ROS: Patient denies fever, chills, nausea, vomiting , headache, visual disturbance, or dysuria.    Past Medical History:   Diagnosis Date   • Substance abuse (HCC)     Meth. currently attending [facility] center in [facility].     Past Surgical History:   Procedure Laterality Date   • CARPAL TUNNEL ENDOSCOPIC Bilateral 2017    Procedure: CARPAL TUNNEL ENDOSCOPIC;  Surgeon: Keron Sykes M.D.;  Location: SURGERY SAME DAY [facility];  Service:    • APPENDECTOMY       OB History    Para Term  AB Living   6 3 3   2 3   SAB TAB Ectopic Molar Multiple Live Births   2         3      # Outcome Date GA Lbr Bayron/2nd Weight Sex Delivery Anes PTL Lv   6 Current            5 Term 16 41w0d  3.4 kg (7 lb 7.9 oz) F Vag-Spont   JORDIN   4 SAB 2014           3 2013           2 Term 11 40w0d  2.977 kg (6 lb 9 oz) M Vag-Spont EPI  JORDIN      Birth Comments: Denies any complications   1 Term 08 42w0d  3.204 kg (7 lb 1 oz) M Vag-Spont EPI  JORDIN      Birth Comments: Denies any complications        Social History     Social History   • Marital status: Single     Spouse name: N/A   • Number of children: N/A   • Years of education: N/A     Occupational History   • Not on file.     Social History Main Topics   • Smoking status: Never Smoker   • Smokeless tobacco: Never Used   • Alcohol use No   • Drug use: No      Comment: Meth use IV 2 years ago, no drug use presently.    • Sexual activity: Not Currently      Partners: Male      Comment: none. UNplanned pregnancy     Other Topics Concern   • Not on file     Social History Narrative   • No narrative on file     Allergies: Patient has no known allergies.  No current facility-administered medications on file prior to encounter.      Current Outpatient Prescriptions on File Prior to Encounter   Medication Sig Dispense Refill   • azithromycin (ZITHROMAX) 250 MG Tab Take two tabs by mouth on day one, then one tab by mouth daily on days 2-5. 6 Tab 0         Objective:      There were no vitals taken for this visit.    General: Afebrile, NAD  Lungs: CTABL  Heart: RRR, NRMG  Abdomen: gravid, nontender,   Skin: No rash  Extremities: no peripheral edema  EFW: 3600g  FHRT:135, mod variability, no decels, + acels   Presentation: vertex by bedside U/S   Cervix /-3      Lab Review  Lab:   Blood type: A    Hgb: non-reactive   HIV: non-reactive   Rubella: immune    GBS: negative   1 hr GTT:100          Invalid input(s):  ABSCRN,  CBC PLTDF,  HEMOGLOBIN,  PLATELETCT,  HBA1C,  ZCRUZUL2JO, HIV, CHLAM    No results for input(s): WBC, RBC, HEMOGLOBIN, HEMATOCRIT, MCV, MCH, RDW, PLATELETCT, MPV, NEUTSPOLYS, LYMPHOCYTES, MONOCYTES, EOSINOPHILS, BASOPHILS, RBCMORPHOLO in the last 72 hours.  No results for input(s): SODIUM, POTASSIUM, CHLORIDE, CO2, GLUCOSE, BUN, CPKTOTAL in the last 72 hours.        Assessment/Plan:   Ruddy Cuellar is a 29 y.o. year old female  at  40w3d who presents with painful contractions and making cervical change.       - Admit to L&D  - Anticipate   - GBS negative, PNL wnl, A+  - Desires epidural   - Hx of drug use, SW consult, multiple negative UDS during pregnancy

## 2019-05-26 NOTE — PROGRESS NOTES
28yo, , edc2019, 40.3 weeks admitted from triage. Pt denies LOF, vag bleeding. POS fm. EFM and [place] placed. VSS.      1200 - Report from Liza DRAKE  1245 - Pt requesting pain medication. Called Dr. Flores about pain medication, orders received for Fentanyl (See MAR).  1315 - Pt requesting epidural at this time, Dr. Hennessy called for epidural.  1329 - Dr. Hennessy at bedside, epidural placed, pt tolerated well.  1550 - Dr. Scullion called to the room, repetitive variables seen on strip  1605 - Dr. Walden called to the room for delivery   1610 -  of a viable female, APGARS 8/8  1640 - Dr. Adams called to the room for help with delivery of the placenta  1657 - Manual removal of an intact placenta  1705 -Verified with Dr. Flores, we do not need to bag the baby. History of meth use x4 years, multiple clean UA's in the office, available in prenatal record  190 - Report to Norma DRAKE

## 2019-05-26 NOTE — ANESTHESIA PROCEDURE NOTES
Epidural Block  Performed by: GER FOX  Authorized by: GER FOX     Patient Location:  OB  Start Time:  5/26/2019 1:37 PM  Reason for Block: labor analgesia    patient identified, IV checked, site marked, risks and benefits discussed, surgical consent, monitors and equipment checked, pre-op evaluation and timeout performed    Patient Position:  Sitting  Prep: ChloraPrep, patient draped and sterile technique    Monitoring:  Blood pressure, continuous pulse oximetry and heart rate  Approach:  Midline  Location:  L2-L3  Injection Technique:  RIN air  Skin infiltration:  Lidocaine  Strength:  1%  Dose:  3ml  Needle Type:  Tuohy  Needle Gauge:  17 G  Needle Length:  3.5 in  Loss of resistance::  7  Catheter Size:  19 G  Catheter at Skin Depth:  20  Test Dose:  Lidocaine 1.5% with epinephrine 1-to-200,000  Test Dose Result:  Negative

## 2019-05-27 LAB
ERYTHROCYTE [DISTWIDTH] IN BLOOD BY AUTOMATED COUNT: 43.2 FL (ref 35.9–50)
HCT VFR BLD AUTO: 35.2 % (ref 37–47)
HGB BLD-MCNC: 11.4 G/DL (ref 12–16)
MCH RBC QN AUTO: 28.8 PG (ref 27–33)
MCHC RBC AUTO-ENTMCNC: 32.4 G/DL (ref 33.6–35)
MCV RBC AUTO: 88.9 FL (ref 81.4–97.8)
PLATELET # BLD AUTO: 144 K/UL (ref 164–446)
PMV BLD AUTO: 11.7 FL (ref 9–12.9)
RBC # BLD AUTO: 3.96 M/UL (ref 4.2–5.4)
WBC # BLD AUTO: 18 K/UL (ref 4.8–10.8)

## 2019-05-27 PROCEDURE — 36415 COLL VENOUS BLD VENIPUNCTURE: CPT

## 2019-05-27 PROCEDURE — 700112 HCHG RX REV CODE 229: Performed by: FAMILY MEDICINE

## 2019-05-27 PROCEDURE — 700102 HCHG RX REV CODE 250 W/ 637 OVERRIDE(OP): Performed by: FAMILY MEDICINE

## 2019-05-27 PROCEDURE — 770002 HCHG ROOM/CARE - OB PRIVATE (112)

## 2019-05-27 PROCEDURE — 700102 HCHG RX REV CODE 250 W/ 637 OVERRIDE(OP): Performed by: STUDENT IN AN ORGANIZED HEALTH CARE EDUCATION/TRAINING PROGRAM

## 2019-05-27 PROCEDURE — 85027 COMPLETE CBC AUTOMATED: CPT

## 2019-05-27 PROCEDURE — A9270 NON-COVERED ITEM OR SERVICE: HCPCS | Performed by: FAMILY MEDICINE

## 2019-05-27 PROCEDURE — 700111 HCHG RX REV CODE 636 W/ 250 OVERRIDE (IP): Performed by: OBSTETRICS & GYNECOLOGY

## 2019-05-27 PROCEDURE — A9270 NON-COVERED ITEM OR SERVICE: HCPCS | Performed by: STUDENT IN AN ORGANIZED HEALTH CARE EDUCATION/TRAINING PROGRAM

## 2019-05-27 RX ORDER — SODIUM CHLORIDE, SODIUM LACTATE, POTASSIUM CHLORIDE, CALCIUM CHLORIDE 600; 310; 30; 20 MG/100ML; MG/100ML; MG/100ML; MG/100ML
INJECTION, SOLUTION INTRAVENOUS
Status: ACTIVE
Start: 2019-05-27 | End: 2019-05-27

## 2019-05-27 RX ADMIN — CEFAZOLIN SODIUM 2 G: 2 INJECTION, SOLUTION INTRAVENOUS at 07:51

## 2019-05-27 RX ADMIN — IBUPROFEN 600 MG: 600 TABLET ORAL at 11:22

## 2019-05-27 RX ADMIN — HYDROCODONE BITARTRATE AND ACETAMINOPHEN 1 TABLET: 5; 325 TABLET ORAL at 11:22

## 2019-05-27 RX ADMIN — HYDROCODONE BITARTRATE AND ACETAMINOPHEN 1 TABLET: 5; 325 TABLET ORAL at 05:44

## 2019-05-27 RX ADMIN — IBUPROFEN 600 MG: 600 TABLET ORAL at 19:24

## 2019-05-27 RX ADMIN — CEFAZOLIN SODIUM 2 G: 2 INJECTION, SOLUTION INTRAVENOUS at 15:28

## 2019-05-27 RX ADMIN — Medication 1 TABLET: at 05:42

## 2019-05-27 RX ADMIN — HYDROCODONE BITARTRATE AND ACETAMINOPHEN 1 TABLET: 5; 325 TABLET ORAL at 19:24

## 2019-05-27 RX ADMIN — IBUPROFEN 600 MG: 600 TABLET ORAL at 02:01

## 2019-05-27 RX ADMIN — DOCUSATE SODIUM 100 MG: 100 CAPSULE, LIQUID FILLED ORAL at 19:24

## 2019-05-27 ASSESSMENT — EDINBURGH POSTNATAL DEPRESSION SCALE (EPDS)
THE THOUGHT OF HARMING MYSELF HAS OCCURRED TO ME: NEVER
I HAVE BEEN SO UNHAPPY THAT I HAVE BEEN CRYING: NO, NEVER
THINGS HAVE BEEN GETTING ON TOP OF ME: NO, I HAVE BEEN COPING AS WELL AS EVER
I HAVE BEEN SO UNHAPPY THAT I HAVE HAD DIFFICULTY SLEEPING: NOT AT ALL
I HAVE FELT SAD OR MISERABLE: NO, NOT AT ALL
I HAVE BLAMED MYSELF UNNECESSARILY WHEN THINGS WENT WRONG: NO, NEVER
I HAVE LOOKED FORWARD WITH ENJOYMENT TO THINGS: AS MUCH AS I EVER DID
I HAVE BEEN ANXIOUS OR WORRIED FOR NO GOOD REASON: HARDLY EVER
I HAVE FELT SCARED OR PANICKY FOR NO GOOD REASON: NO, NOT AT ALL
I HAVE BEEN ABLE TO LAUGH AND SEE THE FUNNY SIDE OF THINGS: AS MUCH AS I ALWAYS COULD

## 2019-05-27 NOTE — PROGRESS NOTES
Procedure note:    The patient's room for complaint of retained placenta.  Delivery occurred approximately 38 minutes ago however placenta failed to deliver spontaneously.  Examination of the cervix noted that the lower uterine segment was tightly clamped down and the cervix was dilated to approximately 3 to 4 cm.  Unable to insert more than 2 fingers and to try to manually sweep the placenta.  Pitocin had been stopped approximately 10 minutes ago.  Called for terbutaline to be brought to the room.  Prior to giving the pre-terbutaline noticed a lengthening of the cord.  Recheck of the cervix noted that previously noted contraction ring had now loosened a bit.  It was able to insert one hand up towards the fundus and sweep the remainder of the placenta out.  The placenta appeared to be intact.  A recheck of the uterus all the way up to the fundus revealed no remaining blood clots or placental remnants.  The uterus then began to clamp down very well.  Postpartum Pitocin was allowed to be started at this time.  Examination of the perineum noted no additional tearing.  No labor associated laceration was noted.  Total estimated blood loss was 100 cc for my portion of the procedure please see the delivery note for additional details.  Excellent hemostasis was achieved at this time.  Patient will be on Ancef for 24 hours because of manual extraction.

## 2019-05-27 NOTE — ANESTHESIA POSTPROCEDURE EVALUATION
Patient: Ruddy Cuellar    Procedure Summary     Date:  05/26/19 Room / Location:      Anesthesia Start:  1328 Anesthesia Stop:  1610    Procedure:  Labor Epidural Diagnosis:      Scheduled Providers:   Responsible Provider:  Arvind Hennessy M.D.    Anesthesia Type:  epidural ASA Status:  2          Final Anesthesia Type: epidural  Last vitals  BP   Blood Pressure: 124/58    Temp   36.7 °C (98.1 °F)    Pulse   Pulse: 95   Resp        SpO2   99 %      Anesthesia Post Evaluation    Patient location during evaluation: floor  Patient participation: complete - patient participated  Level of consciousness: awake and alert  Pain score: 0    Airway patency: patent  Anesthetic complications: no  Cardiovascular status: hemodynamically stable  Respiratory status: acceptable  Hydration status: euvolemic    PONV: none           Nurse Pain Score: 0 (NPRS)

## 2019-05-27 NOTE — PROGRESS NOTES
Assessment done vital signs stable. Patient progressing according to plan of care. Fundus firm at the umbilicus with light lochia. Patient up voiding without difficulty. Ambulating with steady gait. Claims to have good pain relief with p.o medications. Breast and bottle  feeding infant on demand. Family at bedside. Pt claims she will call for any needs. POC discussed

## 2019-05-27 NOTE — CARE PLAN
Problem: Pain Management  Goal: Pain level will decrease to patient's comfort goal  Outcome: PROGRESSING AS EXPECTED  Patient reports mild to moderate pain, reports relief with PRN medications, will continue to monitor.     Problem: Altered physiologic condition related to immediate post-delivery state and potential for bleeding/hemorrhage  Goal: Patient physiologically stable as evidenced by normal lochia, palpable uterine involution and vital signs within normal limits  Outcome: PROGRESSING AS EXPECTED  VSS, fundus firm, light lochia, will continue to monitor.

## 2019-05-27 NOTE — CARE PLAN
Problem: Pain Management  Goal: Pain level will decrease to patient's comfort goal  Outcome: PROGRESSING AS EXPECTED  Pt claims to have good pain relief with p.o medications    Problem: Altered physiologic condition related to immediate post-delivery state and potential for bleeding/hemorrhage  Goal: Patient physiologically stable as evidenced by normal lochia, palpable uterine involution and vital signs within normal limits  Outcome: PROGRESSING AS EXPECTED  FF@U with light lochia

## 2019-05-27 NOTE — LACTATION NOTE
"This note was copied from a baby's chart.  Baby 40.3 weeks. Mother plans to breast & bottle feed, mother reports she breast & bottle fed formula with previous 3 babies for 2 weeks-2 mo. Discussed with mother putting bay to breast first then supplement with formula after, mother has \"supplemental guidelines\" and voiced understanding of volumes per feed to supplement, latch not seen. Information sheet on preparing bottles given per [facility]. Mother reports she has [address] [place] [address], [facility] booklet given with review.     Teaching on hunger cues, breastfeeding when baby shows cues or by 3 hours from last feed, importance of skin to skin, putting baby to breast first, supply & demand.     Breastfeeding POC:  Breastfeed first then supplement according to guideline volumes when baby shows cues or by 3 hours from last feed. F/U with [facility] for outpatient lactation support.    "

## 2019-05-27 NOTE — PROCEDURES
VAGINAL DELIVERY PRODEDURE NOTE    PATIENT ID:  NAME:  Ruddy Cuellar  MRN:               [id number]  YOB: 1990    On 2019 at 1610, this 29 y.o., now 40w3d , GBS negtive female delivered via  under epidural anesthesia a viable female infant weight pending with APGAR scores of 8 and 8 at one and five minutes. There was no nuchal cord and infant was bulb suctioned at delivery. Cord was doubly clamped, cut and infant handed to RN in attendance. Placenta had not delivered at 38 minutes at which time Dr. Adams of [facility] was consulted.  She preformed manual extraction of the placenta which was delivered at 1657 with 3 vessel cord. Upon vaginal exam, there was 1st degree perineal laceration which was hemostatic and did not require repair. Estimated blood loss was 150cc.  Unasyn was started due to manual extraction and will be continued for 24 hours PP. Patient will be transferred to postpartum in stable condition and infant to  nursery.    Candis Flores M.D.    Delivery attended by Dr. Walden  who was present for the entire delivery.

## 2019-05-27 NOTE — PROGRESS NOTES
[facility] POSTPARTUM PROGRESS NOTE    PATIENT ID:  NAME:  Ruddy Cuellar  MRN:               [id number]  YOB: 1990     29 y.o. female admitted  now  at 40w3d PPD#1 s/p .  Complicated by manual extraction of placenta.     Subjective: Abdominal pain: no  Ambulating: yes  Tolerating liquids: yes  Tolerating regular diet: yes  Flatus: no  BM: no  Bleeding: yes, light   Voiding: yes  Dizziness: no    Objective:    Vitals:    19 20419 02019 0600   BP: 119/64 124/76 127/77 132/76   Pulse: (!) 113 (!) 102 64 75   Resp:  16 16 17   Temp:  36.9 °C (98.4 °F) 36.3 °C (97.4 °F) 36.6 °C (97.9 °F)   TempSrc:  Temporal Temporal Temporal   SpO2:  96% 95% 97%   Weight:         General: No acute distress, resting comfortably in bed.  HEENT: normocephalic, nontraumatic, PERRLA, EOMI  Cardiovascular: Heart RRR with no murmurs, rubs or gallops. Distal Pulses 2+  Respiratory: symmetric chest expansion, lungs CTA bilaterally with no wheezes rales or rhonci  Abdomen: soft, mildly tender, fundus firm, +BS  Genitourinary: lochia light, denies excessive vaginal bleeding  Musculoskeletal: strength 5/5 in four extremities  Neuro: non focal with no numbness, tingling or changes in sensation    Recent Labs      19   1235  19   0002   WBC  15.2*  18.0*   RBC  4.82  3.96*   HEMOGLOBIN  13.5  11.4*   HEMATOCRIT  42.1  35.2*   MCV  87.3  88.9   MCH  28.0  28.8   RDW  42.7  43.2   PLATELETCT  196  144*   MPV  11.9  11.7   NEUTSPOLYS  85.40*   --    LYMPHOCYTES  9.60*   --    MONOCYTES  4.00   --    EOSINOPHILS  0.20   --    BASOPHILS  0.30   --      No results for input(s): SODIUM, POTASSIUM, CHLORIDE, CO2, GLUCOSE, BUN, CPKTOTAL in the last 72 hours.    Current Meds:   Current Facility-Administered Medications   Medication Dose Frequency Provider Last Rate Last Dose   • LACTATED RINGERS IV SOLN       Stopped at 19 0400   • ondansetron (ZOFRAN ODT) dispertab 4 mg  4 mg  Q6HRS PRN Candis Flores M.D.        Or   • ondansetron (ZOFRAN) syringe/vial injection 4 mg  4 mg Q6HRS PRN Candis Flores M.D.       • ibuprofen (MOTRIN) tablet 600 mg  600 mg Q6HRS PRN Candis Flores M.D.   600 mg at 19 0201   • acetaminophen (TYLENOL) tablet 325 mg  325 mg Q4HRS PRN Candis Flores M.D.       • HYDROcodone-acetaminophen (NORCO) 5-325 MG per tablet 1 Tab  1 Tab Q4HRS PRN Candis Flores M.D.   1 Tab at 19 0544   • oxytocin (PITOCIN) infusion (for postpartum)   mL/hr Continuous Candis Flores M.D. 125 mL/hr at 19 125 mL/hr at 19   • fentaNYL (SUBLIMAZE) injection 100 mcg  100 mcg Q HOUR PRN Candis Flores M.D.   100 mcg at 19 1337   • ropivacaine (NAROPIN) injection   Continuous Arvind Hennessy M.D.       • LR infusion   PRN Eileen Sanabria M.D.       • miSOPROStol (CYTOTEC) tablet 600 mcg  600 mcg Once PRN Eileen Sanabria M.D.       • PRN oxytocin (PITOCIN) (20 Units/1000 mL) PRN for excessive uterine bleeding - See Admin Instr  125-999 mL/hr Once PRN Eileen Sanabria M.D.       • methylergonovine (METHERGINE) injection 0.2 mg  0.2 mg Once PRN Eileen Sanabria M.D.       • docusate sodium (COLACE) capsule 100 mg  100 mg BID PRN Eileen Sanabria M.D.       • bisacodyl (DULCOLAX) suppository 10 mg  10 mg PRN Eileen Sanabria M.D.       • prenatal plus vitamin (STUARTNATAL 1+1) 27-1 MG tablet 1 Tab  1 Tab QAM Eileen Sanabria M.D.   1 Tab at 19 0542   • ceFAZolin in dextrose (ANCEF) IVPB premix 2 g  2 g Q8HRS Faustina Adams   Stopped at 19 0028     Last reviewed on 2019  1:23 PM by Arvind Hennessy M.D.     Assessment:  29 y.o. female  at 40w3d PPD#1 s/p       Plan:   1. VSS, Hgb 11.4  2. Retained placenta s/p manual extraction, continue ancef for 24 hours PP    Continue routine postpartum care, encourage ambulation, pain control, anticipate D/C home on PPD# 2    Candis Flores M.D.

## 2019-05-27 NOTE — ANESTHESIA QCDR
2019 [facility] Clinical Data Registry (for Quality Improvement)     Postoperative nausea/vomiting risk protocol (Adult = 18 yrs and Pediatric 3-17 yrs)- (430 and 463)  General inhalation anesthetic (NOT TIVA) with PONV risk factors: No  Provision of anti-emetic therapy with at least 2 different classes of agents: N/A  Patient DID NOT receive anti-emetic therapy and reason is documented in Medical Record: N/A    Multimodal Pain Management- (AQI59)  Patient undergoing Elective Surgery (i.e. Outpatient, or ASC, or Prescheduled Surgery prior to Hospital Admission): No  Use of Multimodal Pain Management, two or more drugs and/or interventions, NOT including systemic opioids: N/A  Exception: Documented allergy to multiple classes of analgesics: N/A    PACU assessment of acute postoperative pain prior to Anesthesia Care End- Applies to Patients Age = 18- (ABG7)  Initial PACU pain score is which of the following: < 7/10  Patient unable to report pain score: N/A    Post-anesthetic transfer of care checklist/protocol to PACU/ICU- (426 and 427)  Upon conclusion of case, patient transferred to which of the following locations: PACU/Non-ICU  Use of transfer checklist/protocol: Yes  Exclusion: Service Performed in Patient Hospital Room (and thus did not require transfer): N/A    PACU Reintubation- (AQI31)  General anesthesia requiring endotracheal intubation (ETT) along with subsequent extubation in OR or PACU: No  Required reintubation in the PACU: N/A  Extubation was a planned trial documented in the medical record prior to removal of the original airway device: N/A    Unplanned admission to ICU related to anesthesia service up through end of PACU care- (MD51)  Unplanned admission to ICU (not initially anticipated at anesthesia start time): No

## 2019-05-27 NOTE — PROGRESS NOTES
Patient arrived to S325 with infant in arms accompanied by .  Report received and bands verified with VIDAL Green.  Patient and  oriented to room, call light, infant safe sleep, infant feeding frequency, emergency pull cord, unit routines and plan of care.  Patient states understanding, all questions answered at this time, will continue to monitor.

## 2019-05-27 NOTE — ANESTHESIA TIME REPORT
Anesthesia Start and Stop Event Times     Date Time Event    5/26/2019 1328 Anesthesia Start     1610 Anesthesia Stop        Responsible Staff  05/26/19    Name Role Begin End    Arvind Hennessy M.D. Anesth 1328 1610        Preop Diagnosis (Free Text):  Pre-op Diagnosis             Preop Diagnosis (Codes):    Post op Diagnosis  Vaginal delivery      Premium Reason  E. Weekend    Comments:

## 2019-05-27 NOTE — PROGRESS NOTES
1900 Report received from KATHRYN Traina RN and MEIR Almeida RN at bedside and assumed care. POC discussed with pt and s/o and encouraged to state needs or questions at any time.    2010 Pt up to the bathroom and voided.    2020 Pt transferred to PP via wheelchair with infant in arms. Pt and infant stable.    2025 Report given to Valentine DRAKE at bedside. Bands verified and cuddles active.

## 2019-05-27 NOTE — DISCHARGE PLANNING
Discharge Planning Assessment Post Partum    Reviewed record and discussed with kamille. Met with mother at bedside.     Reason for Referral: History of meth use and schizophrenia  Address: [address] [place] [address]  Type of Living Situation: home  Mom Diagnosis: post partum  Baby Diagnosis:   Primary Language: english    Name of Baby: Zach Jimenez  Father of the Baby: Tim Jimenez  Involved in baby’s care? Yes. Lives in the home, at bedside  Contact Information: [phone]    Prenatal Care: yes  Mom's PCP: none      Support System: family - mother's father, aunt and sister  Coping/Bonding between mother & baby: appropriate per nursing  Source of Feeding: breast  Supplies for Infant: prepared. Car seat and safe sleep discussion    Mom's Insurance: [place] Medicaid  Baby Covered on Insurance:yes  Mother Employed/School: [employer]  Other children in the home/names & ages: 11, 7 and 3 year olds    Financial Hardship/Income: denies   Mom's Mental status: alert and oriented  Services used prior to admit: WIC, SNAP    CPS History: denies  Psychiatric History: denies - schizophrenia type behavior related to methamphetamine use.   Domestic Violence History: denies  Drug/ETOH History: methamphetamine history in 2015. Went to treatment, relapsed and went to [facility] house. She denies use since 2015.     Resources Provided: Community resource list, Pediatrician list, diaper bank info.   Referrals Made: none needed at this time.     Clearance for Discharge: infant cleared to discharge home to parents when medically ready.

## 2019-05-28 VITALS
RESPIRATION RATE: 16 BRPM | WEIGHT: 190 LBS | SYSTOLIC BLOOD PRESSURE: 125 MMHG | DIASTOLIC BLOOD PRESSURE: 81 MMHG | OXYGEN SATURATION: 97 % | HEART RATE: 85 BPM | TEMPERATURE: 98.1 F | BODY MASS INDEX: 34.75 KG/M2

## 2019-05-28 PROCEDURE — 700102 HCHG RX REV CODE 250 W/ 637 OVERRIDE(OP): Performed by: FAMILY MEDICINE

## 2019-05-28 PROCEDURE — A9270 NON-COVERED ITEM OR SERVICE: HCPCS | Performed by: STUDENT IN AN ORGANIZED HEALTH CARE EDUCATION/TRAINING PROGRAM

## 2019-05-28 PROCEDURE — 700102 HCHG RX REV CODE 250 W/ 637 OVERRIDE(OP): Performed by: STUDENT IN AN ORGANIZED HEALTH CARE EDUCATION/TRAINING PROGRAM

## 2019-05-28 PROCEDURE — A9270 NON-COVERED ITEM OR SERVICE: HCPCS | Performed by: FAMILY MEDICINE

## 2019-05-28 RX ADMIN — IBUPROFEN 600 MG: 600 TABLET ORAL at 10:22

## 2019-05-28 RX ADMIN — IBUPROFEN 600 MG: 600 TABLET ORAL at 01:24

## 2019-05-28 RX ADMIN — HYDROCODONE BITARTRATE AND ACETAMINOPHEN 1 TABLET: 5; 325 TABLET ORAL at 10:22

## 2019-05-28 RX ADMIN — Medication 1 TABLET: at 05:27

## 2019-05-28 NOTE — PROGRESS NOTES
Assumed care of patient, report from VIDAL Cook. Patient assessment complete. Re-educated on infant safe sleep and infant feeding frequency, states understanding.  Plan of care discussed, all questions answered at this time, will continue to monitor.

## 2019-05-28 NOTE — PROGRESS NOTES
Assessment done . Patient progressing according to plan of care. Fundus firm at the umbilicus with light lochia. Patient up voiding without difficulty. Ambulating with steady gait. Declines need for pain medication. POC discussed. Pt claims she will call for any needs

## 2019-05-28 NOTE — DISCHARGE SUMMARY
[facility]  NORMAL SPONTANEOUS VAGINAL DISCHARGE SUMMARY    PATIENT ID:  NAME:  Ruddy Cuellar  MRN:               [id number]  YOB: 1990    DATE OF ADMISSION: 2019    DATE OF DISCHARGE: 2019     ADMITTING DIAGNOSIS:  1. Intrauterine pregnancy at 40w3d.    DISCHARGE DIAGNOSIS:  1. s/p   2.Manual extraction of placenta      HOSPITAL COURSE: This is a 29 y.o. year old female admitted at 40w3d who presented with contractions, negative LOF, negative vaginal bleeding, regular FM and in active labor. Pt was 4 cm dilated, 100% effaced and at  -3 station on sterile vaginal exam. Pregnancy was uncomplicated. The patient had a good labor pattern after admission and proceeded to deliver a viable female infant weighing  3150g. Infants Apgars scores were 8 and 8 at one and five minutes. She had retained placenta with manual extraction and was given IV Ancef. She remained afebrile with stable vital signs. The patients postpartum course was uncomplicated. and she was discharged home in stable condition on postpartum day #2.    PROCEDURES PERFORMED: Normal spontaneous vaginal delivery over 1st dgree lac which did not require repair.    COMPLICATIONS: Retained placenta    DIET: Regular    ACTIVITY: No intercourse and nothing inserted into the vagina for 5 weeks.    MEDICATIONS:  No current outpatient prescriptions on file.         FOLLOWUP:  1) [facility]inf[facility] in 5 weeks  2) Return to the hospital if copious vaginal bleeding or foul smelling discharge is noted

## 2019-05-28 NOTE — PROGRESS NOTES
[facility] POSTPARTUM PROGRESS NOTE    PATIENT ID:  NAME:  Ruddy Cuellar  MRN:               [id number]  YOB: 1990     29 y.o. female  at 40w3d PPD#2 s/p  with manual extraction of placenta    Subjective: Doing well overnight. Ambulating without issue. Light lochia. Voiding and stooling.    Objective:    Vitals:    19 1800 19 2200 19 0200 19 0600   BP: 117/73 127/74 130/76 131/90   Pulse: 82 73 73 77   Resp: 17 16 16 17   Temp: 36.5 °C (97.7 °F) 36.3 °C (97.3 °F) 36.4 °C (97.6 °F) 36.4 °C (97.5 °F)   TempSrc: Temporal Temporal Temporal Temporal   SpO2: 96% 96% 95% 97%   Weight:         General: No acute distress, resting comfortably in bed.  HEENT: normocephalic, nontraumatic, PERRLA, EOMI  Cardiovascular: Heart RRR with no murmurs, rubs or gallops. Distal Pulses 2+  Respiratory: symmetric chest expansion, lungs CTA bilaterally with no wheezes rales or rhonci  Abdomen: soft, mildly tender, fundus firm, +BS  Genitourinary: lochia light, denies excessive vaginal bleeding  Musculoskeletal: strength 5/5 in four extremities  Neuro: non focal with no numbness, tingling or changes in sensation    Recent Labs      19   1235  19   0002   WBC  15.2*  18.0*   RBC  4.82  3.96*   HEMOGLOBIN  13.5  11.4*   HEMATOCRIT  42.1  35.2*   MCV  87.3  88.9   MCH  28.0  28.8   RDW  42.7  43.2   PLATELETCT  196  144*   MPV  11.9  11.7   NEUTSPOLYS  85.40*   --    LYMPHOCYTES  9.60*   --    MONOCYTES  4.00   --    EOSINOPHILS  0.20   --    BASOPHILS  0.30   --      No results for input(s): SODIUM, POTASSIUM, CHLORIDE, CO2, GLUCOSE, BUN, CPKTOTAL in the last 72 hours.    Current Meds:   Current Facility-Administered Medications   Medication Dose Frequency Provider Last Rate Last Dose   • ondansetron (ZOFRAN ODT) dispertab 4 mg  4 mg Q6HRS PRN Candis Flores M.D.        Or   • ondansetron (ZOFRAN) syringe/vial injection 4 mg  4 mg Q6HRS PRN Candis Flores M.D.       • ibuprofen (MOTRIN)  tablet 600 mg  600 mg Q6HRS PRN Candis Flores M.D.   600 mg at 19 0124   • acetaminophen (TYLENOL) tablet 325 mg  325 mg Q4HRS PRN Candis Flores M.D.       • HYDROcodone-acetaminophen (NORCO) 5-325 MG per tablet 1 Tab  1 Tab Q4HRS PRN Candis Flores M.D.   1 Tab at 19   • oxytocin (PITOCIN) infusion (for postpartum)   mL/hr Continuous Candis Flores M.D. 125 mL/hr at 19 125 mL/hr at 19   • fentaNYL (SUBLIMAZE) injection 100 mcg  100 mcg Q HOUR PRN Candis Flores M.D.   100 mcg at 19   • ropivacaine (NAROPIN) injection   Continuous Arvind Hennessy M.D.       • LR infusion   PRN Eileen Sanabria M.D.       • miSOPROStol (CYTOTEC) tablet 600 mcg  600 mcg Once PRN Eileen Sanabria M.D.       • PRN oxytocin (PITOCIN) (20 Units/1000 mL) PRN for excessive uterine bleeding - See Admin Instr  125-999 mL/hr Once PRN Eileen Sanabria M.D.       • methylergonovine (METHERGINE) injection 0.2 mg  0.2 mg Once PRN Eileen Sanabria M.D.       • docusate sodium (COLACE) capsule 100 mg  100 mg BID PRN Eileen Sanabria M.D.   100 mg at 19   • bisacodyl (DULCOLAX) suppository 10 mg  10 mg PRN Eileen Sanabria M.D.       • prenatal plus vitamin (STUARTNATAL 1+1) 27-1 MG tablet 1 Tab  1 Tab QAM Eileen Sanabria M.D.   1 Tab at 19     Last reviewed on 2019  1:23 PM by Arvind Hennessy M.D.     Assessment:  29 y.o. female  at 40w3d PPD#2 s/p     Plan:   1. Received Ancef after delivery for manual extraction. Afebrile with VSS.  2. Disposition: Discharge home  3. Follow-up with Whitley Radford in 5 weeks

## 2019-05-28 NOTE — CARE PLAN
Problem: Pain Management  Goal: Pain level will decrease to patient's comfort goal  Outcome: PROGRESSING AS EXPECTED  Reports mild to moderate pain, reports relief with PRN medications will continue to monitor.     Problem: Altered physiologic condition related to immediate post-delivery state and potential for bleeding/hemorrhage  Goal: Patient physiologically stable as evidenced by normal lochia, palpable uterine involution and vital signs within normal limits  Outcome: PROGRESSING AS EXPECTED  VSS, fundus firm, light lochia. Will continue to monitor.

## 2019-05-28 NOTE — DISCHARGE INSTRUCTIONS
POSTPARTUM DISCHARGE INSTRUCTIONS FOR MOM    YOB: 1990   Age: 29 y.o.               Admit Date: 5/26/2019     Discharge Date: 5/28/2019  Attending Doctor:  Lupe Alvarez                  Allergies:  Patient has no known allergies.    Discharged to home by car. Discharged via wheelchair, hospital escort: Yes.  Special equipment needed: Not Applicable  Belongings with: Personal  Be sure to schedule a follow-up appointment with your primary care doctor or any specialists as instructed.     Discharge Plan:   Diet Plan: Discussed  Activity Level: Discussed  Confirmed Follow up Appointment: Patient to Call and Schedule Appointment  Medication Reconciliation Updated: Yes  Influenza Vaccine Indication: Indicated: Not available from distributor/    REASONS TO CALL YOUR OBSTETRICIAN:  1.   Persistent fever or shaking chills (Temperature higher than 100.4)  2.   Heavy bleeding (soaking more than 1 pad per hour); Passing clots  3.   Foul odor from vagina  4.   Mastitis (Breast infection; breast pain, chills, fever, redness)  5.   Urinary pain, burning or frequency  6.   Episiotomy infection  7.   Abdominal incision infection  8.   Severe depression longer than 24 hours    HAND WASHING  · Prior to handling the baby.  · Before breastfeeding or bottle feeding baby.  · After using the bathroom or changing the baby's diaper.      VAGINAL CARE  · Nothing inside vagina for 6 weeks: no sexual intercourse, tampons or douching.  · Bleeding may continue for 2-4 weeks.  Amount may vary.    · Call your physician for heavy bleeding which means soaking more than 1 pad per hour    BIRTH CONTROL  · It is possible to become pregnant at any time after delivery and while breastfeeding.  · Plan to discuss a method of birth control with your physician at your follow up visit. visit.    DIET AND ELIMINATION  · Eating more fiber (bran cereal, fruits, and vegetables) and drinking plenty of fluids will help to avoid  "constipation.  · Urinary frequency after childbirth is normal.    POSTPARTUM BLUES  During the first few days after birth, you may experience a sense of the \"blues\" which may include impatience, irritability or even crying.  These feeling come and go quickly.  However, as many as 1 in 10 women experience emotional symptoms known as postpartum depression.    Postpartum depression:  May start as early as the second or third day after delivery or take several weeks or months to develop.  Symptoms of \"blues\" are present, but are more intense:  Crying spells; loss of appetite; feelings of hopelessness or loss of control; fear of touching the baby; over concern or no concern at all about the baby; little or no concern about your own appearance/caring for yourself; and/or inability to sleep or excessive sleeping.  Contact your physician if you are experiencing any of these symptoms.    Crisis Hotline:  · [place] Crisis Hotline:  7-805-HZNTVQG  Or 1-993.919.2897  · [place] Crisis Hotline:  1-444.730.1725  Or 832-871-2801    PREVENTING SHAKEN BABY:  If you are angry or stressed, PUT THE BABY IN THE CRIB, step away, take some deep breaths, and wait until you are calm to care for the baby.  DO NOT SHAKE THE BABY.  You are not alone, call a supporter for help.    · Crisis Call Center 24/7 crisis line 895-966-3285 or 1-586.215.1091  · You can also text them, text \"ANSWER\" to 767624    QUIT SMOKING/TOBACCO USE:  I understand the use of any tobacco products increases my chance of suffering from future heart disease and could cause other illnesses which may shorten my life. Quitting the use of tobacco products is the single most important thing I can do to improve my health. For further information on smoking / tobacco cessation call a Toll Free Quit Line at 1-447.617.2009 (*[employer] [place]) or 1-226.696.7632 (American Lung Association) or you can access the web based program at www.lungusa.org.    · [place] Tobacco Users " Help Line:  (249) 470-1446       Toll Free: 1-102.597.2546  · Quit Tobacco Program [facility] (116)284-8020    DEPRESSION / SUICIDE RISK:  As you are discharged from this [facility], it is important to learn how to keep safe from harming yourself.    Recognize the warning signs:  · Abrupt changes in personality, positive or negative- including increase in energy   · Giving away possessions  · Change in eating patterns- significant weight changes-  positive or negative  · Change in sleeping patterns- unable to sleep or sleeping all the time   · Unwillingness or inability to communicate  · Depression  · Unusual sadness, discouragement and loneliness  · Talk of wanting to die  · Neglect of personal appearance   · Rebelliousness- reckless behavior  · Withdrawal from people/activities they love  · Confusion- inability to concentrate     If you or a loved one observes any of these behaviors or has concerns about self-harm, here's what you can do:  · Talk about it- your feelings and reasons for harming yourself  · Remove any means that you might use to hurt yourself (examples: pills, rope, extension cords, firearm)  · Get professional help from the community (Mental Health, Substance Abuse, psychological counseling)  · Do not be alone:Call your Safe Contact- someone whom you trust who will be there for you.  · Call your local CRISIS HOTLINE 826-3431 or 005-921-3878  · Call your local Children's Mobile Crisis Response Team [place] (215) 336-3783 or www.[employer]  · Call the toll free National Suicide Prevention Hotlines   · National Suicide Prevention Lifeline 117-529-KCVI (8011)  · National Hope Line Network 800-SUICIDE (125-3192)    DISCHARGE SURVEY:  Thank you for choosing [facility].  We hope we provided you with very good care.  You may be receiving a survey in the mail.  Please fill it out.  Your opinion is valuable to us.    ADDITIONAL EDUCATIONAL MATERIALS GIVEN TO  PATIENT:        My signature on this form indicates that:  1.  I have reviewed and understand the above information  2.  My questions regarding this information have been answered to my satisfaction.  3.  I have formulated a plan with my discharge nurse to obtain my prescribed medication for home.

## 2019-08-13 DIAGNOSIS — Z01.812 PRE-OPERATIVE LABORATORY EXAMINATION: ICD-10-CM

## 2019-08-13 LAB
ANION GAP SERPL CALC-SCNC: 8 MMOL/L (ref 0–11.9)
APPEARANCE UR: ABNORMAL
BACTERIA #/AREA URNS HPF: ABNORMAL /HPF
BASOPHILS # BLD AUTO: 0.6 % (ref 0–1.8)
BASOPHILS # BLD: 0.04 K/UL (ref 0–0.12)
BILIRUB UR QL STRIP.AUTO: NEGATIVE
BUN SERPL-MCNC: 21 MG/DL (ref 8–22)
CALCIUM SERPL-MCNC: 10 MG/DL (ref 8.5–10.5)
CHLORIDE SERPL-SCNC: 106 MMOL/L (ref 96–112)
CO2 SERPL-SCNC: 25 MMOL/L (ref 20–33)
COLOR UR: YELLOW
CREAT SERPL-MCNC: 0.94 MG/DL (ref 0.5–1.4)
EOSINOPHIL # BLD AUTO: 0.31 K/UL (ref 0–0.51)
EOSINOPHIL NFR BLD: 5 % (ref 0–6.9)
EPI CELLS #/AREA URNS HPF: ABNORMAL /HPF
ERYTHROCYTE [DISTWIDTH] IN BLOOD BY AUTOMATED COUNT: 42 FL (ref 35.9–50)
GLUCOSE SERPL-MCNC: 90 MG/DL (ref 65–99)
GLUCOSE UR STRIP.AUTO-MCNC: NEGATIVE MG/DL
HCG SERPL QL: NEGATIVE
HCT VFR BLD AUTO: 39.5 % (ref 37–47)
HGB BLD-MCNC: 12.9 G/DL (ref 12–16)
HYALINE CASTS #/AREA URNS LPF: ABNORMAL /LPF
IMM GRANULOCYTES # BLD AUTO: 0.01 K/UL (ref 0–0.11)
IMM GRANULOCYTES NFR BLD AUTO: 0.2 % (ref 0–0.9)
KETONES UR STRIP.AUTO-MCNC: NEGATIVE MG/DL
LEUKOCYTE ESTERASE UR QL STRIP.AUTO: ABNORMAL
LYMPHOCYTES # BLD AUTO: 1.89 K/UL (ref 1–4.8)
LYMPHOCYTES NFR BLD: 30.6 % (ref 22–41)
MCH RBC QN AUTO: 28.9 PG (ref 27–33)
MCHC RBC AUTO-ENTMCNC: 32.7 G/DL (ref 33.6–35)
MCV RBC AUTO: 88.4 FL (ref 81.4–97.8)
MICRO URNS: ABNORMAL
MONOCYTES # BLD AUTO: 0.4 K/UL (ref 0–0.85)
MONOCYTES NFR BLD AUTO: 6.5 % (ref 0–13.4)
NEUTROPHILS # BLD AUTO: 3.53 K/UL (ref 2–7.15)
NEUTROPHILS NFR BLD: 57.1 % (ref 44–72)
NITRITE UR QL STRIP.AUTO: NEGATIVE
NRBC # BLD AUTO: 0 K/UL
NRBC BLD-RTO: 0 /100 WBC
PH UR STRIP.AUTO: 5.5 [PH] (ref 5–8)
PLATELET # BLD AUTO: 242 K/UL (ref 164–446)
PMV BLD AUTO: 11.4 FL (ref 9–12.9)
POTASSIUM SERPL-SCNC: 4.6 MMOL/L (ref 3.6–5.5)
PROT UR QL STRIP: NEGATIVE MG/DL
RBC # BLD AUTO: 4.47 M/UL (ref 4.2–5.4)
RBC # URNS HPF: ABNORMAL /HPF
RBC UR QL AUTO: NEGATIVE
SODIUM SERPL-SCNC: 139 MMOL/L (ref 135–145)
SP GR UR STRIP.AUTO: >=1.03
UROBILINOGEN UR STRIP.AUTO-MCNC: 0.2 MG/DL
WBC # BLD AUTO: 6.2 K/UL (ref 4.8–10.8)
WBC #/AREA URNS HPF: ABNORMAL /HPF

## 2019-08-13 PROCEDURE — 84703 CHORIONIC GONADOTROPIN ASSAY: CPT

## 2019-08-13 PROCEDURE — 87086 URINE CULTURE/COLONY COUNT: CPT

## 2019-08-13 PROCEDURE — 80048 BASIC METABOLIC PNL TOTAL CA: CPT

## 2019-08-13 PROCEDURE — 36415 COLL VENOUS BLD VENIPUNCTURE: CPT

## 2019-08-13 PROCEDURE — 81001 URINALYSIS AUTO W/SCOPE: CPT

## 2019-08-13 PROCEDURE — 85025 COMPLETE CBC W/AUTO DIFF WBC: CPT

## 2019-08-16 ENCOUNTER — HOSPITAL ENCOUNTER (OUTPATIENT)
Facility: MEDICAL CENTER | Age: 29
End: 2019-08-16
Attending: OBSTETRICS & GYNECOLOGY | Admitting: OBSTETRICS & GYNECOLOGY
Payer: MEDICAID

## 2019-08-16 ENCOUNTER — ANESTHESIA (OUTPATIENT)
Dept: SURGERY | Facility: MEDICAL CENTER | Age: 29
End: 2019-08-16
Payer: MEDICAID

## 2019-08-16 ENCOUNTER — ANESTHESIA EVENT (OUTPATIENT)
Dept: SURGERY | Facility: MEDICAL CENTER | Age: 29
End: 2019-08-16
Payer: MEDICAID

## 2019-08-16 VITALS
DIASTOLIC BLOOD PRESSURE: 72 MMHG | TEMPERATURE: 97.7 F | HEART RATE: 73 BPM | BODY MASS INDEX: 33.02 KG/M2 | OXYGEN SATURATION: 99 % | RESPIRATION RATE: 16 BRPM | SYSTOLIC BLOOD PRESSURE: 107 MMHG | HEIGHT: 62 IN | WEIGHT: 179.45 LBS

## 2019-08-16 DIAGNOSIS — Z30.2 ENCOUNTER FOR FEMALE STERILIZATION PROCEDURE: ICD-10-CM

## 2019-08-16 DIAGNOSIS — G89.18 POST-OP PAIN: ICD-10-CM

## 2019-08-16 LAB
B-HCG FREE SERPL-ACNC: <5 MIU/ML
BACTERIA UR CULT: NORMAL
IHCGL IHCGL: NEGATIVE MIU/ML
PATHOLOGY CONSULT NOTE: NORMAL
SIGNIFICANT IND 70042: NORMAL
SITE SITE: NORMAL
SOURCE SOURCE: NORMAL

## 2019-08-16 PROCEDURE — 700105 HCHG RX REV CODE 258: Performed by: OBSTETRICS & GYNECOLOGY

## 2019-08-16 PROCEDURE — 700101 HCHG RX REV CODE 250

## 2019-08-16 PROCEDURE — 160009 HCHG ANES TIME/MIN: Performed by: OBSTETRICS & GYNECOLOGY

## 2019-08-16 PROCEDURE — 501586 HCHG TROCAR, THRD SPIKE 5X55: Performed by: OBSTETRICS & GYNECOLOGY

## 2019-08-16 PROCEDURE — 700102 HCHG RX REV CODE 250 W/ 637 OVERRIDE(OP)

## 2019-08-16 PROCEDURE — 84702 CHORIONIC GONADOTROPIN TEST: CPT

## 2019-08-16 PROCEDURE — A9270 NON-COVERED ITEM OR SERVICE: HCPCS

## 2019-08-16 PROCEDURE — 160036 HCHG PACU - EA ADDL 30 MINS PHASE I: Performed by: OBSTETRICS & GYNECOLOGY

## 2019-08-16 PROCEDURE — 160029 HCHG SURGERY MINUTES - 1ST 30 MINS LEVEL 4: Performed by: OBSTETRICS & GYNECOLOGY

## 2019-08-16 PROCEDURE — 160002 HCHG RECOVERY MINUTES (STAT): Performed by: OBSTETRICS & GYNECOLOGY

## 2019-08-16 PROCEDURE — 160025 RECOVERY II MINUTES (STATS): Performed by: OBSTETRICS & GYNECOLOGY

## 2019-08-16 PROCEDURE — 700101 HCHG RX REV CODE 250: Performed by: OBSTETRICS & GYNECOLOGY

## 2019-08-16 PROCEDURE — 160048 HCHG OR STATISTICAL LEVEL 1-5: Performed by: OBSTETRICS & GYNECOLOGY

## 2019-08-16 PROCEDURE — 700111 HCHG RX REV CODE 636 W/ 250 OVERRIDE (IP)

## 2019-08-16 PROCEDURE — 501838 HCHG SUTURE GENERAL: Performed by: OBSTETRICS & GYNECOLOGY

## 2019-08-16 PROCEDURE — 160035 HCHG PACU - 1ST 60 MINS PHASE I: Performed by: OBSTETRICS & GYNECOLOGY

## 2019-08-16 PROCEDURE — 160041 HCHG SURGERY MINUTES - EA ADDL 1 MIN LEVEL 4: Performed by: OBSTETRICS & GYNECOLOGY

## 2019-08-16 PROCEDURE — 500886 HCHG PACK, LAPAROSCOPY: Performed by: OBSTETRICS & GYNECOLOGY

## 2019-08-16 PROCEDURE — 88302 TISSUE EXAM BY PATHOLOGIST: CPT

## 2019-08-16 PROCEDURE — 160046 HCHG PACU - 1ST 60 MINS PHASE II: Performed by: OBSTETRICS & GYNECOLOGY

## 2019-08-16 PROCEDURE — A4338 INDWELLING CATHETER LATEX: HCPCS | Performed by: OBSTETRICS & GYNECOLOGY

## 2019-08-16 RX ORDER — LIDOCAINE HYDROCHLORIDE 20 MG/ML
INJECTION, SOLUTION EPIDURAL; INFILTRATION; INTRACAUDAL; PERINEURAL PRN
Status: DISCONTINUED | OUTPATIENT
Start: 2019-08-16 | End: 2019-08-16 | Stop reason: SURG

## 2019-08-16 RX ORDER — BUPIVACAINE HYDROCHLORIDE AND EPINEPHRINE 2.5; 5 MG/ML; UG/ML
INJECTION, SOLUTION EPIDURAL; INFILTRATION; INTRACAUDAL; PERINEURAL
Status: DISCONTINUED
Start: 2019-08-16 | End: 2019-08-16 | Stop reason: HOSPADM

## 2019-08-16 RX ORDER — OXYCODONE HYDROCHLORIDE AND ACETAMINOPHEN 5; 325 MG/1; MG/1
2 TABLET ORAL
Status: COMPLETED | OUTPATIENT
Start: 2019-08-16 | End: 2019-08-16

## 2019-08-16 RX ORDER — METOCLOPRAMIDE HYDROCHLORIDE 5 MG/ML
INJECTION INTRAMUSCULAR; INTRAVENOUS PRN
Status: DISCONTINUED | OUTPATIENT
Start: 2019-08-16 | End: 2019-08-16 | Stop reason: SURG

## 2019-08-16 RX ORDER — HALOPERIDOL 5 MG/ML
1 INJECTION INTRAMUSCULAR
Status: DISCONTINUED | OUTPATIENT
Start: 2019-08-16 | End: 2019-08-16 | Stop reason: HOSPADM

## 2019-08-16 RX ORDER — DIPHENHYDRAMINE HYDROCHLORIDE 50 MG/ML
12.5 INJECTION INTRAMUSCULAR; INTRAVENOUS
Status: DISCONTINUED | OUTPATIENT
Start: 2019-08-16 | End: 2019-08-16 | Stop reason: HOSPADM

## 2019-08-16 RX ORDER — PROMETHAZINE HYDROCHLORIDE 25 MG/1
25 SUPPOSITORY RECTAL EVERY 4 HOURS PRN
Status: DISCONTINUED | OUTPATIENT
Start: 2019-08-16 | End: 2019-08-16 | Stop reason: HOSPADM

## 2019-08-16 RX ORDER — OXYCODONE HYDROCHLORIDE 5 MG/1
5 TABLET ORAL EVERY 4 HOURS PRN
Status: DISCONTINUED | OUTPATIENT
Start: 2019-08-16 | End: 2019-08-16 | Stop reason: HOSPADM

## 2019-08-16 RX ORDER — SIMETHICONE 80 MG
80 TABLET,CHEWABLE ORAL EVERY 8 HOURS PRN
Status: DISCONTINUED | OUTPATIENT
Start: 2019-08-16 | End: 2019-08-16 | Stop reason: HOSPADM

## 2019-08-16 RX ORDER — ONDANSETRON 2 MG/ML
4 INJECTION INTRAMUSCULAR; INTRAVENOUS
Status: DISCONTINUED | OUTPATIENT
Start: 2019-08-16 | End: 2019-08-16 | Stop reason: HOSPADM

## 2019-08-16 RX ORDER — OXYCODONE HYDROCHLORIDE 5 MG/1
5 TABLET ORAL EVERY 4 HOURS PRN
Qty: 20 TAB | Refills: 0 | Status: SHIPPED | OUTPATIENT
Start: 2019-08-16 | End: 2019-08-21

## 2019-08-16 RX ORDER — IBUPROFEN 600 MG/1
600 TABLET ORAL EVERY 6 HOURS PRN
Qty: 30 TAB | Refills: 1 | Status: SHIPPED | OUTPATIENT
Start: 2019-08-16 | End: 2020-10-25

## 2019-08-16 RX ORDER — SODIUM CHLORIDE, SODIUM LACTATE, POTASSIUM CHLORIDE, CALCIUM CHLORIDE 600; 310; 30; 20 MG/100ML; MG/100ML; MG/100ML; MG/100ML
INJECTION, SOLUTION INTRAVENOUS CONTINUOUS
Status: DISCONTINUED | OUTPATIENT
Start: 2019-08-16 | End: 2019-08-16 | Stop reason: HOSPADM

## 2019-08-16 RX ORDER — BUPIVACAINE HYDROCHLORIDE AND EPINEPHRINE 2.5; 5 MG/ML; UG/ML
INJECTION, SOLUTION EPIDURAL; INFILTRATION; INTRACAUDAL; PERINEURAL
Status: DISCONTINUED | OUTPATIENT
Start: 2019-08-16 | End: 2019-08-16 | Stop reason: HOSPADM

## 2019-08-16 RX ORDER — DEXAMETHASONE SODIUM PHOSPHATE 4 MG/ML
INJECTION, SOLUTION INTRA-ARTICULAR; INTRALESIONAL; INTRAMUSCULAR; INTRAVENOUS; SOFT TISSUE PRN
Status: DISCONTINUED | OUTPATIENT
Start: 2019-08-16 | End: 2019-08-16 | Stop reason: SURG

## 2019-08-16 RX ORDER — IBUPROFEN 600 MG/1
600 TABLET ORAL EVERY 6 HOURS PRN
Status: DISCONTINUED | OUTPATIENT
Start: 2019-08-16 | End: 2019-08-16 | Stop reason: HOSPADM

## 2019-08-16 RX ORDER — KETOROLAC TROMETHAMINE 30 MG/ML
INJECTION, SOLUTION INTRAMUSCULAR; INTRAVENOUS PRN
Status: DISCONTINUED | OUTPATIENT
Start: 2019-08-16 | End: 2019-08-16 | Stop reason: SURG

## 2019-08-16 RX ORDER — OXYCODONE HYDROCHLORIDE AND ACETAMINOPHEN 5; 325 MG/1; MG/1
1 TABLET ORAL
Status: COMPLETED | OUTPATIENT
Start: 2019-08-16 | End: 2019-08-16

## 2019-08-16 RX ADMIN — FENTANYL CITRATE 100 MCG: 50 INJECTION, SOLUTION INTRAMUSCULAR; INTRAVENOUS at 12:06

## 2019-08-16 RX ADMIN — PROPOFOL 200 MG: 10 INJECTION, EMULSION INTRAVENOUS at 12:19

## 2019-08-16 RX ADMIN — METOCLOPRAMIDE 8 MG: 5 INJECTION, SOLUTION INTRAMUSCULAR; INTRAVENOUS at 12:02

## 2019-08-16 RX ADMIN — SUGAMMADEX 200 MG: 100 INJECTION, SOLUTION INTRAVENOUS at 12:48

## 2019-08-16 RX ADMIN — KETOROLAC TROMETHAMINE 30 MG: 30 INJECTION, SOLUTION INTRAMUSCULAR at 12:02

## 2019-08-16 RX ADMIN — LIDOCAINE HYDROCHLORIDE 50 ML: 20 INJECTION, SOLUTION EPIDURAL; INFILTRATION; INTRACAUDAL; PERINEURAL at 12:02

## 2019-08-16 RX ADMIN — SODIUM CHLORIDE, POTASSIUM CHLORIDE, SODIUM LACTATE AND CALCIUM CHLORIDE: 600; 310; 30; 20 INJECTION, SOLUTION INTRAVENOUS at 11:55

## 2019-08-16 RX ADMIN — DEXAMETHASONE SODIUM PHOSPHATE 8 MG: 4 INJECTION, SOLUTION INTRA-ARTICULAR; INTRALESIONAL; INTRAMUSCULAR; INTRAVENOUS; SOFT TISSUE at 12:02

## 2019-08-16 RX ADMIN — SODIUM CHLORIDE, POTASSIUM CHLORIDE, SODIUM LACTATE AND CALCIUM CHLORIDE: 600; 310; 30; 20 INJECTION, SOLUTION INTRAVENOUS at 12:01

## 2019-08-16 RX ADMIN — FENTANYL CITRATE 50 MCG: 50 INJECTION, SOLUTION INTRAMUSCULAR; INTRAVENOUS at 13:38

## 2019-08-16 RX ADMIN — FENTANYL CITRATE 100 MCG: 50 INJECTION, SOLUTION INTRAMUSCULAR; INTRAVENOUS at 12:56

## 2019-08-16 RX ADMIN — OXYCODONE HYDROCHLORIDE AND ACETAMINOPHEN 1 TABLET: 5; 325 TABLET ORAL at 13:35

## 2019-08-16 NOTE — PROGRESS NOTES
Admit/ Pre-Op H&P dictated #783143  A- multiparity, desires permanent sterilization  P- LSC bilateral salpingectomy    awestfall

## 2019-08-16 NOTE — OR NURSING
1355 report received from Mely. Awake and alert, speaking with family member.    1405 Denies needing pain medication.    1445 Meets stage 2 criteria. Up to bathroom to void. Tolerated well.     1525 States pain is mild. Dressing. IV d/c'd.    1530 Discharge instructions explained to spouse and patient, copies signed and given.    1535 To family car by wheelchair. Accompanied by spouse Tim. Gait is steady.

## 2019-08-16 NOTE — OR SURGEON
Immediate Post OP Note    PreOp Diagnosis: multiparity; desires permanent sterilization    PostOp Diagnosis: same    Procedure(s):  PELVISCOPY - Wound Class: Clean Contaminated  SALPINGECTOMY - Wound Class: Clean Contaminated    Surgeon(s):  Valorie Porter M.D.    Anesthesiologist/Type of Anesthesia:  Anesthesiologist: Loy Bush M.D./General    Surgical Staff:  Circulator: Silvia Collins R.N.  Scrub Person: Tim Garcia    Specimens removed if any:  ID Type Source Tests Collected by Time Destination   A : bilateral fallopian tubes Tissue Other PATHOLOGY SPECIMEN Valorie Porter M.D. 8/16/2019 12:29 PM        Estimated Blood Loss: minimal    Findings: normal pelvis    Complications: none        8/16/2019 12:55 PM Valorie Porter M.D.

## 2019-08-16 NOTE — ANESTHESIA QCDR
2019 [facility] Clinical Data Registry (for Quality Improvement)     Postoperative nausea/vomiting risk protocol (Adult = 18 yrs and Pediatric 3-17 yrs)- (430 and 463)  General inhalation anesthetic (NOT TIVA) with PONV risk factors: Yes  Provision of anti-emetic therapy with at least 2 different classes of agents: Yes   Patient DID NOT receive anti-emetic therapy and reason is documented in Medical Record:  N/A    Multimodal Pain Management- (AQI59)  Patient undergoing Elective Surgery (i.e. Outpatient, or ASC, or Prescheduled Surgery prior to Hospital Admission): Yes  Use of Multimodal Pain Management, two or more drugs and/or interventions, NOT including systemic opioids: Yes   Exception: Documented allergy to multiple classes of analgesics:  N/A    PACU assessment of acute postoperative pain prior to Anesthesia Care End- Applies to Patients Age = 18- (ABG7)  Initial PACU pain score is which of the following: < 7/10  Patient unable to report pain score: N/A    Post-anesthetic transfer of care checklist/protocol to PACU/ICU- (426 and 427)  Upon conclusion of case, patient transferred to which of the following locations: PACU/Non-ICU  Use of transfer checklist/protocol: Yes  Exclusion: Service Performed in Patient Hospital Room (and thus did not require transfer): N/A    PACU Reintubation- (AQI31)  General anesthesia requiring endotracheal intubation (ETT) along with subsequent extubation in OR or PACU: No  Required reintubation in the PACU: N/A  Extubation was a planned trial documented in the medical record prior to removal of the original airway device: N/A    Unplanned admission to ICU related to anesthesia service up through end of PACU care- (MD51)  Unplanned admission to ICU (not initially anticipated at anesthesia start time): No

## 2019-08-16 NOTE — PROGRESS NOTES
1302pt adm to PACU from OR via yaima salas by RN and Anesthesia. Report received and care assumed. Monitors on - VSS, breathing even and unlabored. bandaids - NADEEN, kinza-pad on  - pt denies pain or  Nausea  1339 - SO at bedside, patient states pain 6/10 - see MAR , warm comp to lower pelvic area  1345 report to Shira DRAKE

## 2019-08-16 NOTE — DISCHARGE INSTRUCTIONS
ACTIVITY: Rest and take it easy for the first 24 hours.  A responsible adult is recommended to remain with you during that time.  It is normal to feel sleepy.  We encourage you to not do anything that requires balance, judgment or coordination.    MILD FLU-LIKE SYMPTOMS ARE NORMAL. YOU MAY EXPERIENCE GENERALIZED MUSCLE ACHES, THROAT IRRITATION, HEADACHE AND/OR SOME NAUSEA.    FOR 24 HOURS DO NOT:  Drive, operate machinery or run household appliances.  Drink beer or alcoholic beverages.   Make important decisions or sign legal documents.    SPECIAL INSTRUCTIONS: See Laparoscopy handout    DIET: To avoid nausea, slowly advance diet as tolerated, avoiding spicy or greasy foods for the first day.  Add more substantial food to your diet according to your physician's instructions.  Babies can be fed formula or breast milk as soon as they are hungry.  INCREASE FLUIDS AND FIBER TO AVOID CONSTIPATION.    SURGICAL DRESSING/BATHING: Remove outer dressing tomorrow, leave any steri strips if present.    FOLLOW-UP APPOINTMENT:  A follow-up appointment should be arranged with your doctor in 2 weeks. ; call to schedule.    You should CALL YOUR PHYSICIAN if you develop:  Fever greater than 101 degrees F.  Pain not relieved by medication, or persistent nausea or vomiting.  Excessive bleeding (blood soaking through dressing) or unexpected drainage from the wound.  Extreme redness or swelling around the incision site, drainage of pus or foul smelling drainage.  Inability to urinate or empty your bladder within 8 hours.  Problems with breathing or chest pain.    You should call 911 if you develop problems with breathing or chest pain.  If you are unable to contact your doctor or surgical center, you should go to the nearest emergency room or urgent care center.  Physician's telephone #: [phone]    If any questions arise, call your doctor.  If your doctor is not available, please feel free to call the Surgical Center at  [phone].  The Center is open Monday through Friday from 7AM to 7PM.  You can also call the HEALTH HOTLINE open 24 hours/day, 7 days/week and speak to a nurse at [phone], or toll free at [phone].    A registered nurse may call you a few days after your surgery to see how you are doing after your procedure.    MEDICATIONS: Resume taking daily medication.  Take prescribed pain medication with food.  If no medication is prescribed, you may take non-aspirin pain medication if needed.  PAIN MEDICATION CAN BE VERY CONSTIPATING.  Take a stool softener or laxative such as senokot, pericolace, or milk of magnesia if needed.    Prescription given for Oxycodone and Ibuprofen .  Last pain medication given at Oxycodone at 1:35 pm..    If your physician has prescribed pain medication that includes Acetaminophen (Tylenol), do not take additional Acetaminophen (Tylenol) while taking the prescribed medication.    Depression / Suicide Risk    As you are discharged from this [facility] facility, it is important to learn how to keep safe from harming yourself.    Recognize the warning signs:  · Abrupt changes in personality, positive or negative- including increase in energy   · Giving away possessions  · Change in eating patterns- significant weight changes-  positive or negative  · Change in sleeping patterns- unable to sleep or sleeping all the time   · Unwillingness or inability to communicate  · Depression  · Unusual sadness, discouragement and loneliness  · Talk of wanting to die  · Neglect of personal appearance   · Rebelliousness- reckless behavior  · Withdrawal from people/activities they love  · Confusion- inability to concentrate     If you or a loved one observes any of these behaviors or has concerns about self-harm, here's what you can do:  · Talk about it- your feelings and reasons for harming yourself  · Remove any means that you might use to hurt yourself (examples: pills, rope, extension cords,  firearm)  · Get professional help from the community (Mental Health, Substance Abuse, psychological counseling)  · Do not be alone:Call your Safe Contact- someone whom you trust who will be there for you.  · Call your local CRISIS HOTLINE 852-9559 or 627-331-6626  · Call your local Children's Mobile Crisis Response Team [place] (648) 008-0990 or www.[employer]  · Call the toll free National Suicide Prevention Hotlines   · National Suicide Prevention Lifeline 956-591-SLHH (6733)  · National Hope Line Network 800-SUICIDE (897-5347)

## 2019-08-16 NOTE — ANESTHESIA TIME REPORT
Anesthesia Start and Stop Event Times     Date Time Event    8/16/2019 1200 Ready for Procedure     1201 Anesthesia Start     1310 Anesthesia Stop        Responsible Staff  08/16/19    Name Role Begin End    Loy Bush M.D. Anesth 1217 1310        Preop Diagnosis (Free Text):  Pre-op Diagnosis     UNDESIRED FERTILITY         Preop Diagnosis (Codes):    Post op Diagnosis  Sterilization      Premium Reason  Non-Premium    Comments:

## 2019-08-16 NOTE — ANESTHESIA POSTPROCEDURE EVALUATION
Patient: Ruddy Cuellar    Procedure Summary     Date:  08/16/19 Room / Location:  [facility] ROOM 28 / SURGERY SAME DAY [facility] ORS    Anesthesia Start:  1201 Anesthesia Stop:  1310    Procedures:       PELVISCOPY (N/A Abdomen)      SALPINGECTOMY (Bilateral Abdomen) Diagnosis:  (UNDESIRED FERTILITY )    Surgeon:  Valorie Porter M.D. Responsible Provider:  Loy Bush M.D.    Anesthesia Type:  general ASA Status:  2          Final Anesthesia Type: general  Last vitals  BP   Blood Pressure: 112/63, NIBP: 122/79    Temp   36.5 °C (97.7 °F)    Pulse   Pulse: 86   Resp   12    SpO2   99 %      Anesthesia Post Evaluation    Patient location during evaluation: PACU  Patient participation: complete - patient participated  Level of consciousness: awake and alert    Airway patency: patent  Anesthetic complications: no  Cardiovascular status: hemodynamically stable  Respiratory status: acceptable  Hydration status: euvolemic    PONV: none           Nurse Pain Score: 0 (NPRS)

## 2019-08-16 NOTE — OP REPORT
DATE OF SERVICE:  08/16/2019    PREOPERATIVE DIAGNOSES:  1.  Multiparity.  2.  Desires permanent sterilization.    POSTOPERATIVE DIAGNOSES:  1.  Multiparity.  2.  Desires permanent sterilization.    PROCEDURE PERFORMED:  Laparoscopic bilateral salpingectomy.    SURGEON:  Valorie Porter MD    ANESTHESIOLOGIST:  Loy Bush MD    ANESTHESIA:  General endotracheal anesthesia.    COMPLICATIONS:  None.    ESTIMATED BLOOD LOSS:  Minimal.    SPECIMENS TO PATHOLOGY:  Include bilateral tubes with fimbria.    INTRAOPERATIVE FINDINGS:  Include a normal pelvis, normal tubes and ovaries   bilaterally.    PROCEDURE IN DETAIL:  After proper consents obtained, the patient was taken to   the operating room where general anesthesia was obtained by Dr. Bush   without difficulty and she was placed in dorsal lithotomy position in the   [facility].  Exam under anesthesia reveals a parous cervix, mobile, normal   size uterus and no adnexal masses identified.  She was then prepped in normal   sterile fashion, and after the appropriate time interval, she was draped in   normal sterile fashion.  At this time, a bivalve speculum was placed in the   vaginal vault.  Anterior lip of cervix was grasped with single tooth   tenaculum.  The uterine sound was inserted through the external and internal   cervical os and the uterus was sounded to be 8.5 cm.  At this time, a Hulka   manipulator was inserted to provide a means to manipulate the uterus.  Single   tooth tenaculum removed, bivalve speculum removed, gloves changed, legs   lowered and attention was turned to the infraumbilical area, which was   injected with local anesthetic and an incision was made with a scalpel.  This   incision was carried down to the underlying fascia under direct visualization   with the use of S retractors and hemostats.  The fascia was identified,   grasped with Kochers x2 and incised in the midline with Metzenbaum scissors.    The lateral aspect of  fascial incision was tagged with 0 Vicryl suture tag.  S   retractors replaced.  Peritoneum was identified and entered bluntly, and a   Annamaria cannula was inserted under direct visualization, it was held into place   with the balloon.  Pneumoperitoneum was then obtained with CO2 gas.  Camera   was inserted and correct placement was confirmed.  She was then placed in   Trendelenburg position.  The bowel was pushed gently up towards the upper   abdomen with the blunt probe through the infraumbilical port.  At this time,   the pelvis appears to be normal.  The suprapubic area was injected with local   anesthetic and a stab incision made and an Apple trocar was inserted under   direct visualization.  The LigaSure was inserted through the operative port.    A locking grasper was placed through the suprapubic port with manipulation of   the uterus and using the graspers, the ovaries and tubes bilaterally, and the   uterus itself were visualized.  Anterior and posterior cul-de-sacs were   visualized without gross pathology.  At this time, the uterus was manipulated   for best access to the patient's right fallopian tube, which was identified,   followed out to the fimbriated end, grasped at the fimbriated end with the   grasper through the suprapubic port and using the LigaSure, the entire fimbria   and tube are removed with serial cautery and cutting up to the cornu where it   was completely cauterized and transected and the right tube was pulled intact   through the infraumbilical port as visually confirmed to be tube inclusive of   fimbriated end.  The area of excision was visualized and seen to be   hemostatic and pictures were taken to confirm.  Attention was then turned to   the left fallopian tube with again manipulation, was visualized, followed to   the fimbriated end, grasped at the fimbriated end and using the LigaSure   through the operative port, the tube and fimbria were completely transected,   excised up to  the cornual region where it was transected and excised, again it   was grasped and pulled easily through the operative port, again visually   confirmed.  There was a small amount of bleeding from the cornual portion of   the uterus, which was made hemostatic using the LigaSure and cautery.  It was   watched for several minutes and is completely hemostatic.  Pictures taken to   confirm complete removal of both fallopian tubes and hemostasis.  All areas   are again revisualized.  She was then placed flat out of Trendelenburg.  All   areas are visualized and are hemostatic.  No pathology identified.  The   suprapubic port was removed under direct visualization and the instruments   were removed through the operative port.  The pneumoperitoneum was released   through the operative port.  The balloon was deflated and the Annamaria cannula   was removed.  The fascia was closed with the previously placed 0 Vicryl suture   tag and a second 0 Vicryl, 2 interrupted sutures.  The fascia was manually   palpated and is intact and well approximated.  At this time, both skin   incisions were closed with a 4-0 Monocryl in a subcuticular fashion.  Both   incisions are adequately closed and hemostatic and attention was turned to the   vagina where the legs were raised.  Hulka manipulator removed.  [place]   speculum replaced and the cervix was visualized.  There was no active bleeding   from the cervical os or from the single tooth or Hulka site, and at this   time, all instruments and sponges were removed and the procedure was ended.    Sponge, lap, needle and instrument counts were correct and the patient was   taken to recovery room awake and in stable condition.  She will be discharged   home after day surgery criteria met with routine postoperative instructions,   prescriptions and followup.       ____________________________________     MD KAYLEEN Holland / CED    DD:  08/16/2019 13:02:09  DT:  08/16/2019  14:04:16    D#:  [id number]  Job#:  [id number]

## 2019-08-16 NOTE — H&P
IDENTIFICATION:  This is a 29-year-old female  5, para 4-0-1-4, who   presents for laparoscopic permanent sterilization via bilateral salpingectomy.    HISTORY OF PRESENT ILLNESS:  She has had prenatal care through [facility] nurse midwives for her previous pregnancy delivered   in May of this year, an uncomplicated delivery.  I saw her for her desire of   permanent sterilization on the 06/10, at which time, we had an in-depth   discussion about tubal sterilization, the permanent nature, the nonreversible   nature of this procedure, and she refuses any other method of contraception   and desires permanent sterilization.    PAST SURGICAL HISTORY:  None.    SURGERIES:  Appendectomy and carpal tunnel surgery.    ALLERGIES:  No known drug allergies.    CURRENT MEDICATIONS:  None.    SOCIAL HISTORY:  She has a prior history of methamphetamine use.  No recent   illicit drug use.    FAMILY HISTORY:  Noncontributory and negative for anesthetic reactions.    REVIEW OF SYSTEMS:  Completely negative for full 10 review of systems.    PHYSICAL EXAMINATION:  VITAL SIGNS:  On last office visit, weight 181 pounds, blood pressure 102/70,   urinalysis negative.  HEENT:  Within normal limits.  NECK:  Supple.  LUNGS:  Clear to auscultation bilaterally.  HEART:  Regular rate and rhythm.  ABDOMEN:  Soft, nondistended, nontender.  No CVA or suprapubic tenderness.  PELVIC:  Performed by certified nurse midwife, external genitalia, Bartholin,   urethral, and Burr glands within normal limits.  Vagina is pink and moist   without gross lesions.  Cervix is parous, but closed.  No cervical motion   tenderness.  Uterus is normal size, shape, contour, mobile, nontender, no   adnexal masses identified.    DIAGNOSTIC STUDY:  Note, she notes she did have a pelvic ultrasound on    for continued postpartum bleeding, although her postpartum bleeding had   stopped just prior to the ultrasound.  Ultrasound  showed the uterus 9x5x8,   normal right ovary, normal left ovary, endometrial is 7 mm in thickness, does   show some possible uterine varices.  Otherwise, a normal pelvic ultrasound.    DISCUSSION:  Thorough discussion with the patient concerning specific risks,   benefits, and alternatives to the surgical procedure itself discussed in   detail including the risks of anesthesia, which include the risk of death,   risk of injury to other intra-abdominal pelvic organs including, but not   limited, to bowel, bladder, blood vessels, ureters and nerves all requiring   further surgical repair; delayed diagnosis, possible permanent organ damage,   risk of hemorrhage requiring transfusion, she verbally consents to blood   transfusion in emergent situation; risk of bowel injury requiring repair up to   including permanent colostomy; risk of bladder and ureteral injuries   requiring repair up to and including permanent catheterization reimplantation;   risk of nerve damage from intraoperative placement and intraoperative damage   causing chronic pain syndromes and loss of limb mobility.  She understands she   will have abdominal scarring from the incisions.  She understands this is a   nonreversible procedure and that she cannot conceive pregnancy without the use   of in vitro fertilization.  Different methods of tubal sterilization were   discussed and she would like to proceed as recommended with complete bilateral   salpingectomy.  Postoperative concerns, warning signs, and course all   discussed.  All questions answered.  She would like to proceed as scheduled.    ASSESSMENT:  1.  Grand multiparity.  2.  Desires permanent sterilization.    PLAN:  Patient scheduled for laparoscopic bilateral salpingectomy today,   08/16/2019, at [facility] in [place].       ____________________________________     MD KAYLEEN Holland / CED    DD:  08/16/2019 11:11:06  DT:  08/16/2019 11:54:55    D#:  [id number]   Job#:  [id number]

## 2020-04-26 ENCOUNTER — HOSPITAL ENCOUNTER (EMERGENCY)
Facility: MEDICAL CENTER | Age: 30
End: 2020-04-26
Attending: EMERGENCY MEDICINE
Payer: MEDICAID

## 2020-04-26 VITALS
RESPIRATION RATE: 14 BRPM | WEIGHT: 176 LBS | TEMPERATURE: 97.5 F | HEIGHT: 62 IN | OXYGEN SATURATION: 95 % | DIASTOLIC BLOOD PRESSURE: 68 MMHG | BODY MASS INDEX: 32.39 KG/M2 | SYSTOLIC BLOOD PRESSURE: 109 MMHG | HEART RATE: 71 BPM

## 2020-04-26 DIAGNOSIS — J45.21 MILD INTERMITTENT ASTHMA WITH ACUTE EXACERBATION: ICD-10-CM

## 2020-04-26 PROCEDURE — 94640 AIRWAY INHALATION TREATMENT: CPT

## 2020-04-26 PROCEDURE — 700111 HCHG RX REV CODE 636 W/ 250 OVERRIDE (IP): Performed by: EMERGENCY MEDICINE

## 2020-04-26 PROCEDURE — 99284 EMERGENCY DEPT VISIT MOD MDM: CPT

## 2020-04-26 PROCEDURE — 700101 HCHG RX REV CODE 250: Performed by: EMERGENCY MEDICINE

## 2020-04-26 RX ORDER — METHYLPREDNISOLONE 4 MG/1
TABLET ORAL
Qty: 21 TAB | Refills: 0 | Status: SHIPPED | OUTPATIENT
Start: 2020-04-26 | End: 2020-10-25

## 2020-04-26 RX ORDER — PREDNISONE 20 MG/1
20 TABLET ORAL ONCE
Status: COMPLETED | OUTPATIENT
Start: 2020-04-26 | End: 2020-04-26

## 2020-04-26 RX ORDER — ALBUTEROL SULFATE 90 UG/1
2 AEROSOL, METERED RESPIRATORY (INHALATION) EVERY 6 HOURS PRN
Qty: 8.5 G | Refills: 1 | Status: SHIPPED | OUTPATIENT
Start: 2020-04-26 | End: 2020-10-25

## 2020-04-26 RX ORDER — PREDNISONE 20 MG/1
40 TABLET ORAL ONCE
Status: DISCONTINUED | OUTPATIENT
Start: 2020-04-26 | End: 2020-04-26

## 2020-04-26 RX ADMIN — ALBUTEROL SULFATE 2.5 MG: 2.5 SOLUTION RESPIRATORY (INHALATION) at 10:22

## 2020-04-26 RX ADMIN — PREDNISONE 20 MG: 20 TABLET ORAL at 10:13

## 2020-04-26 NOTE — ED TRIAGE NOTES
"Name and  verified for triage    Pt here with c/o SOB x 2 days. Denies cough, fever, N/V, or diarrhea. Hx asthma- states using breathing tx- only getting temporary relief. States \"im having trouble catching my breath\". Denies recent travel or exposure to covid.     VSS  "

## 2020-04-26 NOTE — ED PROVIDER NOTES
ED Provider Note    CHIEF COMPLAINT  Chief Complaint   Patient presents with   • Shortness of Breath       HPI  Ruddy Cuellar is a 30 y.o. female who presents to emergency room today with complaints of shortness of breath and wheezing.  Patient has history of asthma states that she recently had kittens from her cat at home and that she is allergic to cats and this may have triggered worsening of her asthma last several days has had trouble breathing and side of her medications for asthma.  No chest pain but shortness of breath no fever chills no cough or congestion.  No COVID contacts/no recent travel outside the area.    REVIEW OF SYSTEMS  See HPI for further details. All other systems are negative.      PAST MEDICAL HISTORY  Past Medical History:   Diagnosis Date   • Substance abuse (HCC)     Meth. currently attending [facility]  recovery center in [facility].       FAMILY HISTORY  No family history on file.    SOCIAL HISTORY  Social History     Socioeconomic History   • Marital status: Single     Spouse name: Not on file   • Number of children: Not on file   • Years of education: Not on file   • Highest education level: Not on file   Occupational History   • Not on file   Social Needs   • Financial resource strain: Not on file   • Food insecurity     Worry: Not on file     Inability: Not on file   • Transportation needs     Medical: Not on file     Non-medical: Not on file   Tobacco Use   • Smoking status: Never Smoker   • Smokeless tobacco: Never Used   Substance and Sexual Activity   • Alcohol use: No   • Drug use: No     Types: Intravenous, Methamphetamines     Comment: Meth use IV 2 years ago, no drug use presently.    • Sexual activity: Not Currently     Partners: Male     Comment: none. UNplanned pregnancy   Lifestyle   • Physical activity     Days per week: Not on file     Minutes per session: Not on file   • Stress: Not on file   Relationships   • Social connections     Talks on phone: Not on file     Gets  "together: Not on file     Attends [employer] service: Not on file     Active member of club or organization: Not on file     Attends meetings of clubs or organizations: Not on file     Relationship status: Not on file   • Intimate partner violence     Fear of current or ex partner: Not on file     Emotionally abused: Not on file     Physically abused: Not on file     Forced sexual activity: Not on file   Other Topics Concern   • Not on file   Social History Narrative   • Not on file       SURGICAL HISTORY  Past Surgical History:   Procedure Laterality Date   • PB LAP,DIAGNOSTIC ABDOMEN N/A 8/16/2019    Procedure: PELVISCOPY;  Surgeon: Valorie Porter M.D.;  Location: SURGERY SAME DAY [facility];  Service: Gynecology   • SALPINGECTOMY Bilateral 8/16/2019    Procedure: SALPINGECTOMY;  Surgeon: Valorie Porter M.D.;  Location: SURGERY SAME DAY [facility] [place];  Service: Gynecology   • CARPAL TUNNEL ENDOSCOPIC Bilateral 5/26/2017    Procedure: CARPAL TUNNEL ENDOSCOPIC;  Surgeon: Keron Sykes M.D.;  Location: SURGERY SAME DAY [place];  Service:    • APPENDECTOMY  2004       CURRENT MEDICATIONS  Home Medications    **Home medications have not yet been reviewed for this encounter**         ALLERGIES  No Known Allergies    PHYSICAL EXAM  VITAL SIGNS: /68   Pulse 71   Temp 36.4 °C (97.5 °F) (Temporal)   Resp 14   Ht 1.575 m (5' 2\")   Wt 79.8 kg (176 lb)   SpO2 95%   BMI 32.19 kg/m²       Constitutional :  Well developed, Well nourished,  acute distress, Non-toxic appearance.   HENT: Pharynx without injection  Eyes: perrla  Neck: Normal range of motion, No tenderness, Supple, No stridor.   Lymphatic: None noted.   Cardiovascular: Normal heart rate, Normal rhythm, No murmurs, No rubs, No gallops.   Thorax & Lungs: End expiratory wheezing which cleared with treatment  Skin: Warm, Dry, No erythema, No rash.   Extremities: Intact distal pulses, No edema, No tenderness, No cyanosis, No clubbing. "           COURSE & MEDICAL DECISION MAKING  Pertinent Labs & Imaging studies reviewed. (See chart for details)  Patient was given breathing treatment with resolution of her symptoms given first dose of prednisone here and placed on Medrol Dosepak refilled on albuterol nebulized treatment as well as MDI.  We will follow-up with primary care physician.  Return if persistent worsening symptoms she is discharged in stable improved condition as above to home.    FINAL IMPRESSION  1.  Acute exacerbation of asthma  2.   3.      Electronically signed by: Travis Lewis D.O., 4/26/2020

## 2020-10-25 ENCOUNTER — HOSPITAL ENCOUNTER (EMERGENCY)
Facility: MEDICAL CENTER | Age: 30
End: 2020-10-25
Attending: EMERGENCY MEDICINE
Payer: MEDICAID

## 2020-10-25 VITALS
DIASTOLIC BLOOD PRESSURE: 65 MMHG | OXYGEN SATURATION: 97 % | BODY MASS INDEX: 30.36 KG/M2 | TEMPERATURE: 97 F | RESPIRATION RATE: 20 BRPM | WEIGHT: 165 LBS | HEIGHT: 62 IN | SYSTOLIC BLOOD PRESSURE: 125 MMHG | HEART RATE: 75 BPM

## 2020-10-25 DIAGNOSIS — B34.9 VIRAL SYNDROME: ICD-10-CM

## 2020-10-25 LAB
COVID ORDER STATUS COVID19: NORMAL
SARS-COV-2 RNA RESP QL NAA+PROBE: NOTDETECTED
SPECIMEN SOURCE: NORMAL

## 2020-10-25 PROCEDURE — 99283 EMERGENCY DEPT VISIT LOW MDM: CPT | Mod: EDC

## 2020-10-25 PROCEDURE — U0003 INFECTIOUS AGENT DETECTION BY NUCLEIC ACID (DNA OR RNA); SEVERE ACUTE RESPIRATORY SYNDROME CORONAVIRUS 2 (SARS-COV-2) (CORONAVIRUS DISEASE [COVID-19]), AMPLIFIED PROBE TECHNIQUE, MAKING USE OF HIGH THROUGHPUT TECHNOLOGIES AS DESCRIBED BY CMS-2020-01-R: HCPCS | Mod: EDC

## 2020-10-25 PROCEDURE — C9803 HOPD COVID-19 SPEC COLLECT: HCPCS | Mod: EDC | Performed by: EMERGENCY MEDICINE

## 2020-10-25 ASSESSMENT — LIFESTYLE VARIABLES
EVER HAD A DRINK FIRST THING IN THE MORNING TO STEADY YOUR NERVES TO GET RID OF A HANGOVER: NO
HAVE PEOPLE ANNOYED YOU BY CRITICIZING YOUR DRINKING: NO
DO YOU DRINK ALCOHOL: NO
EVER FELT BAD OR GUILTY ABOUT YOUR DRINKING: NO
TOTAL SCORE: 0
HAVE YOU EVER FELT YOU SHOULD CUT DOWN ON YOUR DRINKING: NO
TOTAL SCORE: 0
TOTAL SCORE: 0
CONSUMPTION TOTAL: INCOMPLETE

## 2020-10-25 NOTE — ED TRIAGE NOTES
Chief Complaint   Patient presents with   • Fever     x2 days   • Cough     x2 days   • Runny Nose     x2 days     Pt presents to ED d/t above complaints. Per pt, she has had fevers x2 days, with a temp of 100.3 this morning, medicated with nyquil. Pt has also had coughs and runny nose x2 days. Pt lung sounds clear. Pt provided gown and warm blanket. Call light is within reach. Chart up for ERP.

## 2020-10-25 NOTE — ED PROVIDER NOTES
ED Provider Note    Scribed for Mary Corado M.D. by Kourtney Pierce. 10/25/2020, 10:16 AM.    Primary Care Provider: Pcp Pt States None  Means of arrival: Walk in   History obtained from: Patient   History limited by: None     This patient was cared for during the COVID-19 pandemic. History and physical exam may be limited/truncated by the inherent challenges of PPE and the need to decrease staff exposure to novel coronavirus. Some aspects of disease management may be different to protect staff and help slow the spread of disease. I verified that, if possible, the patient was wearing a mask and I was wearing appropriate PPE every time I encountered the patient.      CHIEF COMPLAINT  Chief Complaint   Patient presents with   • Fever     x2 days   • Cough     x2 days   • Runny Nose     x2 days         HPI  Ruddy Cuellar is a 30 y.o. female who presents to the Emergency Department for a cough onset 2 days ago. The patient states that her daughter has also had symptoms and is being assessed in the pediatric ER.  She says there are multiple family members with similar symptoms. She reports associated fever, rhinorrhea, and fatigue. Patient denies diarrhea, vomiting, or loss of taste or smell.     REVIEW OF SYSTEMS  Pertinent positives include cough, fever, rhinorrhea, and fatigue. Pertinent negatives include no diarrhea, vomiting, or loss of taste or smell.     PAST MEDICAL HISTORY   has a past medical history of Substance abuse (HCC).    SOCIAL HISTORY  Social History     Tobacco Use   • Smoking status: Never Smoker   • Smokeless tobacco: Never Used   Substance Use Topics   • Alcohol use: No   • Drug use: No     Types: Intravenous, Methamphetamines     Comment: Meth use IV 2 years ago, no drug use presently.       Social History     Substance and Sexual Activity   Drug Use No   • Types: Intravenous, Methamphetamines    Comment: Meth use IV 2 years ago, no drug use presently.        SURGICAL HISTORY   has  "a past surgical history that includes appendectomy (2004); carpal tunnel endoscopic (Bilateral, 5/26/2017); lap,diagnostic abdomen (N/A, 8/16/2019); and salpingectomy (Bilateral, 8/16/2019).     CURRENT MEDICATIONS  Home Medications     Reviewed by Rafiq Barrientos R.N. (Registered Nurse) on 10/25/20 at 1017  Med List Status: Complete   Medication Last Dose Status        Patient Ihsan Taking any Medications                       ALLERGIES  No Known Allergies    PHYSICAL EXAM  VITAL SIGNS: /73   Pulse 77   Temp 36.3 °C (97.4 °F) (Temporal)   Resp 18   Ht 1.575 m (5' 2\")   Wt 74.8 kg (165 lb)   SpO2 99%   BMI 30.18 kg/m²   Constitutional: Alert in no apparent distress. Well apearing  HENT: Normocephalic, Atraumatic, Bilateral external ears normal. Nose normal.   Eyes:  Conjunctiva normal, non-icteric.   Lungs: Non-labored respirations, clear to auscultation bilaterally  Heart: Regular rate and rhythm no murmurs rubs or gallops  Skin: Warm, Dry, No erythema, No rash.   Neurologic: Alert, Grossly non-focal.   Psychiatric: Affect normal, Judgment normal, Mood normal, Appears appropriate and not intoxicated.     LABS  Labs Reviewed   COVID/SARS COV-2    Narrative:     Is patient being admitted?->No  Expected turn around time?->Routine (In-House PCR up to 24  hours)  Is this the patients First SARS CoV-2 test?->Yes  Is this patient employed in healthcare?->No  Is the patient symptomatic as defined by the CDC?->Yes  Date of symptom onset?->10/23/20  Is the patient hospitalized?->No  Is the patient a resident in a congregate care setting?->No  Is the patient pregnant?->No   SARS-COV-2, PCR (IN-HOUSE)    Narrative:     Is patient being admitted?->No  Expected turn around time?->Routine (In-House PCR up to 24  hours)  Is this the patients First SARS CoV-2 test?->Yes  Is this patient employed in healthcare?->No  Is the patient symptomatic as defined by the CDC?->Yes  Date of symptom onset?->10/23/20  Is the " "patient hospitalized?->No  Is the patient a resident in a congregate care setting?->No  Is the patient pregnant?->No     All labs reviewed by me.    COURSE & MEDICAL DECISION MAKING  Pertinent Labs & Imaging studies reviewed. (See chart for details)    10:16 AM - Patient seen and examined at bedside.  I suspect that the patient has a viral upper respiratory symptoms.  She has no loss of taste or smell and no GI symptoms consistent with Covid.  I do think it is reasonable to order an outpatient Covid test.  Ordered COVID/SARS to evaluate her symptoms. Informed patient that she will be discharged home and she will receive a call if her test comes back positive. Patient was given the opportunity for questions. I addressed all questions or concerns at this time and they verbalize agreement to the plan of care. Review of vital signs at this visit show: /73   Pulse 77   Temp 36.3 °C (97.4 °F) (Temporal)   Resp 18   Ht 1.575 m (5' 2\")   Wt 74.8 kg (165 lb)   SpO2 99%   BMI 30.18 kg/m²         HTN/IDDM FOLLOW UP:  The patient is referred to a primary physician for blood pressure management, diabetic screening, and for all other preventive health concerns          The patient will return for new or worsening symptoms and is stable at the time of discharge. Patient was given return precautions. Patient and/or family member verbalizes understanding and will comply.    DISPOSITION:  Patient will be discharged home in stable condition.    FOLLOW UP:  [facility], Emergency Dept  [address] [facility] [facility] [address] [phone]    Return for worsening symptoms, difficulty breathing or other concerns      OUTPATIENT MEDICATIONS:  There are no discharge medications for this patient.       FINAL IMPRESSION  1. Viral syndrome         This dictation has been created using voice recognition software and/or scribes. The accuracy of the dictation is limited by the abilities of the software and the " expertise of the scribes. I expect there may be some errors of grammar and possibly content. I made every attempt to manually correct the errors within my dictation. However, errors related to voice recognition software and/or scribes may still exist and should be interpreted within the appropriate context.     I, Kourtney Pierce (Jovanibstephen), am scribing for, and in the presence of, Mary Corado M.D..    Electronically signed by: Kourtney Pierce (Michael), 10/25/2020    IMary M.D. personally performed the services described in this documentation, as scribed by Kourtney Pierce in my presence, and it is both accurate and complete. E    The note accurately reflects work and decisions made by me.  Mary Corado M.D.  10/25/2020  1:44 PM

## 2020-10-25 NOTE — ED NOTES
[facility]ud[facility].    Discharge instructions, which include signs and symptoms to monitor patient for, hydration and hand hygiene importance, as well as detailed information regarding viral infections provided.  This RN also encouraged a follow- up appointment to be made with patient's PCP. Instructions given regarding self isolation until COVID has been resulted. All questions and concerns addressed at this time.      Patient leaves ER in no apparent distress, is awake, alert, pink, interactive and age appropriate. Family is aware of the need to return to the ER for any concerns or changes in current condition.

## 2020-11-06 ENCOUNTER — HOSPITAL ENCOUNTER (EMERGENCY)
Facility: MEDICAL CENTER | Age: 30
End: 2020-11-06
Attending: EMERGENCY MEDICINE
Payer: MEDICAID

## 2020-11-06 VITALS
WEIGHT: 167.99 LBS | HEART RATE: 93 BPM | DIASTOLIC BLOOD PRESSURE: 78 MMHG | TEMPERATURE: 98.1 F | HEIGHT: 62 IN | BODY MASS INDEX: 30.91 KG/M2 | SYSTOLIC BLOOD PRESSURE: 114 MMHG | OXYGEN SATURATION: 98 % | RESPIRATION RATE: 16 BRPM

## 2020-11-06 DIAGNOSIS — L50.9 URTICARIA: ICD-10-CM

## 2020-11-06 PROCEDURE — 99283 EMERGENCY DEPT VISIT LOW MDM: CPT | Mod: EDC

## 2020-11-06 PROCEDURE — 700102 HCHG RX REV CODE 250 W/ 637 OVERRIDE(OP): Mod: EDC | Performed by: EMERGENCY MEDICINE

## 2020-11-06 PROCEDURE — A9270 NON-COVERED ITEM OR SERVICE: HCPCS | Mod: EDC | Performed by: EMERGENCY MEDICINE

## 2020-11-06 RX ORDER — PREDNISONE 20 MG/1
TABLET ORAL
Qty: 18 TAB | Refills: 0 | Status: SHIPPED | OUTPATIENT
Start: 2020-11-06

## 2020-11-06 RX ORDER — FAMOTIDINE 20 MG/1
20 TABLET, FILM COATED ORAL ONCE
Status: COMPLETED | OUTPATIENT
Start: 2020-11-06 | End: 2020-11-06

## 2020-11-06 RX ADMIN — FAMOTIDINE 20 MG: 20 TABLET, FILM COATED ORAL at 21:32

## 2020-11-06 ASSESSMENT — LIFESTYLE VARIABLES
EVER FELT BAD OR GUILTY ABOUT YOUR DRINKING: NO
TOTAL SCORE: 0
TOTAL SCORE: 0
EVER HAD A DRINK FIRST THING IN THE MORNING TO STEADY YOUR NERVES TO GET RID OF A HANGOVER: NO
CONSUMPTION TOTAL: INCOMPLETE
HAVE PEOPLE ANNOYED YOU BY CRITICIZING YOUR DRINKING: NO
TOTAL SCORE: 0
HAVE YOU EVER FELT YOU SHOULD CUT DOWN ON YOUR DRINKING: NO
DO YOU DRINK ALCOHOL: NO

## 2020-11-06 ASSESSMENT — ENCOUNTER SYMPTOMS
FEVER: 0
SHORTNESS OF BREATH: 1

## 2020-11-07 NOTE — ED NOTES
Patient ambulatory to peds 44 independently.  Patient is noted to have an urticarial appearing rash that started today around 1000.  Denies any new lotions, soaps, foods, or medications.  Chart up for ERP.

## 2020-11-07 NOTE — ED NOTES
"Educated patient on discharge instructions, rx medications, and follow up with PCP, [facility]  [address] [facility] [address] [phone]        ; voiced understanding rec'vd. VS stable, /78   Pulse 93   Temp 36.7 °C (98.1 °F) (Temporal)   Resp 16   Ht 1.575 m (5' 2\")   Wt 76.2 kg (167 lb 15.9 oz)   LMP 10/27/2020   SpO2 98%   BMI 30.73 kg/m²    Patient alert and appropriate. Skin PWD. NAD. All questions and concerns addressed. No further questions or concerns at this time. Copy of discharge paperwork provided.  Patient out of department in stable condition.    "

## 2020-11-07 NOTE — ED PROVIDER NOTES
ED Provider Note    Scribed for Yifan España M.D. by Kourtney Pierce. 11/6/2020, 9:14 PM.    Primary care provider: Pcp Not In Computer  Means of arrival: Walk in   History obtained from: Patient   History limited by: None     CHIEF COMPLAINT  Chief Complaint   Patient presents with   • Rash     Itchy rash on left leg, torso, back, left arm. Pt reports it developed steadily throughout the day. No changes in detergent, denies allergens       HPI  Ruddy Cuellar is a 30 y.o. female who presents to the Emergency Department for hives onset this morning. The patient states that she has never had symptoms like this before. She denies taking new medications, eating new foods, or using new detergents. She reports associated shortness of breath. Patient denies fever.     REVIEW OF SYSTEMS  Review of Systems   Constitutional: Negative for fever.   Respiratory: Positive for shortness of breath.    Skin: Positive for rash.       PAST MEDICAL HISTORY   has a past medical history of Substance abuse (HCC).    SURGICAL HISTORY   has a past surgical history that includes appendectomy (2004); carpal tunnel endoscopic (Bilateral, 5/26/2017); lap,diagnostic abdomen (N/A, 8/16/2019); and salpingectomy (Bilateral, 8/16/2019).    SOCIAL HISTORY  Social History     Tobacco Use   • Smoking status: Never Smoker   • Smokeless tobacco: Never Used   Substance Use Topics   • Alcohol use: No   • Drug use: No     Types: Intravenous, Methamphetamines     Comment: Meth use IV 2 years ago, no drug use presently.       Social History     Substance and Sexual Activity   Drug Use No   • Types: Intravenous, Methamphetamines    Comment: Meth use IV 2 years ago, no drug use presently.        FAMILY HISTORY  History reviewed. No pertinent family history.    CURRENT MEDICATIONS  Home Medications     Reviewed by Umair Calhoun R.N. (Registered Nurse) on 11/06/20 at 2002  Med List Status: Complete   Medication Last Dose Status        Patient Ihsan  "Taking any Medications                       ALLERGIES  No Known Allergies    PHYSICAL EXAM  VITAL SIGNS: /75   Pulse 99   Temp 36.5 °C (97.7 °F) (Temporal)   Resp 14   Ht 1.575 m (5' 2\")   Wt 76.2 kg (167 lb 15.9 oz)   LMP 10/27/2020   SpO2 97%   BMI 30.73 kg/m²     Constitutional: Alert in no apparent distress.  HENT: Normocephalic, Bilateral external ears normal. Nose normal.   Eyes: Pupils are equal and reactive. Conjunctiva normal, non-icteric.   Thorax & Lungs: Easy unlabored respirations  Skin: Visualized skin is dry. Scattered erythema and urticaria on trunk and extremities consistent with urticaria.    COURSE & MEDICAL DECISION MAKING  Pertinent Labs & Imaging studies reviewed. (See chart for details)    9:14 PM - Patient seen and examined at bedside. Discussed plan for discharge; I advised the patient to  over the counter benadryl from the store, and to return to the [facility] ED with any new or worsening symptoms. Patient was given the opportunity for questions. I addressed all questions or concerns at this time and they verbalize agreement to the plan of care.      The patient will return for new or worsening symptoms and is stable at the time of discharge.    The patient is referred to a primary physician for blood pressure management, diabetic screening, and for all other preventative health concerns.      DISPOSITION:  Patient will be discharged home in stable condition.    FOLLOW UP:  No follow-up provider specified.    OUTPATIENT MEDICATIONS:  New Prescriptions    PREDNISONE (DELTASONE) 20 MG TAB    Take 60mg q am for 3 days,then Take 40mg q am for 3 days, then Take 20 mg q am for 3 days        FINAL IMPRESSION  1. Urticaria          Kourtney RAMACHANDRAN (Scribe), am scribing for, and in the presence of, Yifan España M.D..    Electronically signed by: Kourtney Collins), 11/6/2020    Yifan RAMACHANDRAN M.D. personally performed the services described in this " documentation, as scribed by Kourtney Pierce in my presence, and it is both accurate and complete. E    The note accurately reflects work and decisions made by me.  Yifan España M.D.  11/6/2020  9:32 PM

## 2020-11-07 NOTE — ED TRIAGE NOTES
"Ruddy Sandra Alisa  30 y.o. female  Chief Complaint   Patient presents with   • Rash     Itchy rash on left leg, torso, back, left arm. Pt reports it developed steadily throughout the day. No changes in detergent, denies allergens     Pt ambulated to triage with steady gait for above complaint.     COVID screening negative.     Pt back to lobby. Educated to inform staff of any concerns or changes.     Blood Pressure: 121/75, Pulse: 99, Respiration: 14, Temperature: 36.5 °C (97.7 °F), Height: 157.5 cm (5' 2\"), Weight: 76.2 kg (167 lb 15.9 oz), BMI (Calculated): 30.73, BSA (Calculated): 1.8, Pulse Oximetry: 97 %    "

## 2021-07-21 NOTE — ANESTHESIA PROCEDURE NOTES
Airway  Date/Time: 8/16/2019 12:03 PM  Performed by: Lyo Bush M.D.  Authorized by: Loy Bush M.D.     Location:  OR  Urgency:  Elective  Indications for Airway Management:  Anesthesia  Spontaneous Ventilation: absent    Sedation Level:  Deep  Preoxygenated: Yes    Patient Position:  Sniffing  Final Airway Type:  Endotracheal airway  Final Endotracheal Airway:  ETT  Cuffed: Yes    Technique Used for Successful ETT Placement:  Video laryngoscopy  Insertion Site:  Oral  Blade Type:  Stephen  Laryngoscope Blade/Videolaryngoscope Blade Size:  3  ETT Size (mm):  6.5  Measured from:  Teeth  ETT to Teeth (cm):  22  Placement Verified by: auscultation and capnometry    Cormack-Lehane Classification:  Grade I - full view of glottis  Number of Attempts at Approach:  1        
Other (Free Text): Patient aware quote is good for 3 months
Render Risk Assessment In Note?: no
Detail Level: Zone
Note Text (......Xxx Chief Complaint.): This diagnosis correlates with the tinea versicolor

## 2022-08-18 ENCOUNTER — APPOINTMENT (OUTPATIENT)
Dept: RADIOLOGY | Facility: MEDICAL CENTER | Age: 32
End: 2022-08-18
Attending: EMERGENCY MEDICINE
Payer: COMMERCIAL

## 2022-08-18 ENCOUNTER — HOSPITAL ENCOUNTER (EMERGENCY)
Facility: MEDICAL CENTER | Age: 32
End: 2022-08-18
Attending: EMERGENCY MEDICINE
Payer: COMMERCIAL

## 2022-08-18 VITALS
WEIGHT: 179.01 LBS | SYSTOLIC BLOOD PRESSURE: 125 MMHG | OXYGEN SATURATION: 97 % | HEIGHT: 62 IN | HEART RATE: 49 BPM | RESPIRATION RATE: 16 BRPM | DIASTOLIC BLOOD PRESSURE: 63 MMHG | TEMPERATURE: 96.9 F | BODY MASS INDEX: 32.94 KG/M2

## 2022-08-18 DIAGNOSIS — R06.00 DYSPNEA, UNSPECIFIED TYPE: ICD-10-CM

## 2022-08-18 DIAGNOSIS — J06.9 UPPER RESPIRATORY TRACT INFECTION, UNSPECIFIED TYPE: ICD-10-CM

## 2022-08-18 LAB
EKG IMPRESSION: NORMAL
FLUAV RNA SPEC QL NAA+PROBE: NEGATIVE
FLUBV RNA SPEC QL NAA+PROBE: NEGATIVE
RSV RNA SPEC QL NAA+PROBE: NEGATIVE
SARS-COV-2 RNA RESP QL NAA+PROBE: NOTDETECTED
SPECIMEN SOURCE: NORMAL

## 2022-08-18 PROCEDURE — 0241U HCHG SARS-COV-2 COVID-19 NFCT DS RESP RNA 4 TRGT MIC: CPT

## 2022-08-18 PROCEDURE — 71045 X-RAY EXAM CHEST 1 VIEW: CPT

## 2022-08-18 PROCEDURE — 93005 ELECTROCARDIOGRAM TRACING: CPT | Performed by: EMERGENCY MEDICINE

## 2022-08-18 PROCEDURE — 99283 EMERGENCY DEPT VISIT LOW MDM: CPT

## 2022-08-18 PROCEDURE — 93005 ELECTROCARDIOGRAM TRACING: CPT

## 2022-08-18 PROCEDURE — C9803 HOPD COVID-19 SPEC COLLECT: HCPCS | Performed by: EMERGENCY MEDICINE

## 2022-08-18 RX ORDER — ALBUTEROL SULFATE 90 UG/1
2 AEROSOL, METERED RESPIRATORY (INHALATION) EVERY 6 HOURS PRN
Qty: 8.5 G | Refills: 0 | Status: SHIPPED | OUTPATIENT
Start: 2022-08-18

## 2022-08-18 RX ORDER — PREDNISONE 20 MG/1
60 TABLET ORAL DAILY
Qty: 9 TABLET | Refills: 0 | Status: SHIPPED | OUTPATIENT
Start: 2022-08-18 | End: 2022-08-21

## 2022-08-18 RX ORDER — PREDNISONE 20 MG/1
60 TABLET ORAL DAILY
Qty: 9 TABLET | Refills: 0 | Status: SHIPPED | OUTPATIENT
Start: 2022-08-18 | End: 2022-08-18 | Stop reason: SDUPTHER

## 2022-08-18 NOTE — ED NOTES
Assumed care of pt. Report received from VIDAL Rosas. Pt at this time is AxOx4. Vss at this time. Resp even and nonlabored. Will continue to monitor.

## 2022-08-18 NOTE — ED TRIAGE NOTES
Ruddy Cuellar  32 y.o. female  Chief Complaint   Patient presents with    Shortness of Breath     Cough since Saturday, 8/13. No home COVID test, denies sick contacts.     Pt ambulatory with steady gait to triage for above complaint. Pt reports cough and chest tightness but no pain.     Pt is alert, oriented, and follows commands. Pt speaking in full sentences and responds appropriately to questions. No acute distress noted in triage and respirations are even and unlabored.     Pt placed in triage room for EKG and educated on triage process. Pt encouraged to alert staff for any changes in condition.

## 2022-08-18 NOTE — ED PROVIDER NOTES
ED Provider Note    CHIEF COMPLAINT  Chief Complaint   Patient presents with    Shortness of Breath     Cough since Saturday, 8/13. No home COVID test, denies sick contacts.       HPI  Ruddy Cuellar is a 32 y.o. female who presents to the emergency department with complaint of cough.  She states that she had increasing cough for the last 5 days.  She has had subjective fever as well.  The patient has a nonproductive cough, denies chest pain, swelling lower extremities, nausea, vomiting.  She is vaccinated for COVID and is on a home test.    REVIEW OF SYSTEMS  Positives as above. Pertinent negatives include productive cough, vomiting, chest pain, swelling lower extremities  All other 10 review of systems are negative    PAST MEDICAL HISTORY  Past Medical History:   Diagnosis Date    Substance abuse (HCC)     Meth. currently attending [facility] in [facility].       FAMILY HISTORY  Noncontributory    SOCIAL HISTORY  Social History     Socioeconomic History    Marital status: Single   Tobacco Use    Smoking status: Never    Smokeless tobacco: Never   Vaping Use    Vaping Use: Never used   Substance and Sexual Activity    Alcohol use: No    Drug use: No     Types: Intravenous, Methamphetamines     Comment: Meth use IV 2 years ago, no drug use presently.     Sexual activity: Not Currently     Partners: Male     Comment: none. UNplanned pregnancy       SURGICAL HISTORY  Past Surgical History:   Procedure Laterality Date    OH LAP,DIAGNOSTIC ABDOMEN N/A 8/16/2019    Procedure: PELVISCOPY;  Surgeon: Valorie Porter M.D.;  Location: SURGERY SAME DAY [facility];  Service: Gynecology    SALPINGECTOMY Bilateral 8/16/2019    Procedure: SALPINGECTOMY;  Surgeon: Valorie Porter M.D.;  Location: SURGERY SAME DAY [facility] ORS;  Service: Gynecology    CARPAL TUNNEL ENDOSCOPIC Bilateral 5/26/2017    Procedure: CARPAL TUNNEL ENDOSCOPIC;  Surgeon: Keron Sykes M.D.;  Location: SURGERY SAME DAY [facility] ORS;  " Service:     APPENDECTOMY  2004       CURRENT MEDICATIONS  Home Medications       Reviewed by Korin Hartman R.N. (Registered Nurse) on 22 at 0502  Med List Status: Complete     Medication Last Dose Status   predniSONE (DELTASONE) 20 MG Tab Not Taking Active                    ALLERGIES  No Known Allergies    PHYSICAL EXAM  VITAL SIGNS: /63   Pulse (!) 49   Temp 36.1 °C (96.9 °F) (Temporal)   Resp 16   Ht 1.575 m (5' 2\")   Wt 81.2 kg (179 lb 0.2 oz)   SpO2 97%   BMI 32.74 kg/m²      Constitutional: Well developed, Well nourished, No acute distress, Non-toxic appearance.   Eyes: PERRLA, EOMI, Conjunctiva normal, No discharge.   Cardiovascular: Normal heart rate, Normal rhythm, No murmurs, No rubs, No gallops, and intact distal pulses.   Thorax & Lungs:  No respiratory distress, no rales, no rhonchi, slight wheezing throughout, no chest wall tenderness.   Abdomen: Bowel sounds normal, Soft, No tenderness, No guarding, No rebound, No pulsatile masses.   Skin: Warm, Dry, No erythema, No rash.   Extremities: Full range of motion, no deformity, no pedal edema.  Neurologic: Alert & oriented x 3, No focal deficits noted, acting appropriately on exam.  Psychiatric: Affect normal for clinical presentation.      LABORATORY/ECG  Results for orders placed or performed during the hospital encounter of 22   CoV-2, FLU A/B, and RSV by PCR (2-4 Hours CEPHEID) : Collect NP swab in VTM    Specimen: Respirate   Result Value Ref Range    Influenza virus A RNA Negative Negative    Influenza virus B, PCR Negative Negative    RSV, PCR Negative Negative    SARS-CoV-2 by PCR NotDetected     SARS-CoV-2 Source NP Swab    EKG   Result Value Ref Range    Report       [facility] Emergency Dept.    Test Date:  2022  Pt Name:    WESLEY IBANEZ                 Department: ER  MRN:        [id number]                      Room:  Gender:     Female                       Technician: [id number]  :        " 1990                   Requested By:ER TRIAGE PROTOCOL  Order #:    [id number]                    Milvia MD: HEMANT LERMA DO    Measurements  Intervals                                Axis  Rate:       52                           P:          36  UT:         140                          QRS:        58  QRSD:       80                           T:          35  QT:         419  QTc:        390    Interpretive Statements  Sinus bradycardia  No previous ECG available for comparison  Electronically Signed On 8- 14:23:15 PDT by HEMANT LERMA DO           RADIOLOGY/PROCEDURES  DX-CHEST-PORTABLE (1 VIEW)   Final Result         1.  No acute cardiopulmonary disease.            COURSE & MEDICAL DECISION MAKING  Pertinent Labs & Imaging studies reviewed. (See chart for details)  This is a [facility] 32-year female presents with nonproductive cough.  Here in the emergency department, the patient had no evidence of pneumonia on x-ray, she is not hypoxic or tachypneic I do expect her to have a pulm embolism.  The patient does not have evidence of COVID nor does she have evidence of influenza.  She plans a viral syndrome and have given her an albuterol inhaler prescription as well as prednisone for short course.  The patient has strict return precautions before she was discharged and speaking in full sentences no distress.      FINAL IMPRESSION     1. Dyspnea, unspecified type    2. Upper respiratory tract infection, unspecified type        DISPOSITION:  Patient will be discharged home in stable condition.    FOLLOW UP:  [facility], Emergency Dept  [address] [facility] [address] [phone]    If symptoms worsen      OUTPATIENT MEDICATIONS:  Discharge Medication List as of 8/18/2022  8:20 AM        START taking these medications    Details   albuterol 108 (90 Base) MCG/ACT Aero Soln inhalation aerosol Inhale 2 Puffs every 6 hours as needed for Shortness of Breath., Disp-8.5  g, R-0, Normal               Electronically signed by: Samuel Garcia D.O., 8/18/2022 6:31 AM

## 2024-09-26 ENCOUNTER — OFFICE VISIT (OUTPATIENT)
Dept: MEDICAL GROUP | Facility: MEDICAL CENTER | Age: 34
End: 2024-09-26
Payer: COMMERCIAL

## 2024-09-26 VITALS
OXYGEN SATURATION: 99 % | TEMPERATURE: 97.9 F | SYSTOLIC BLOOD PRESSURE: 106 MMHG | DIASTOLIC BLOOD PRESSURE: 60 MMHG | BODY MASS INDEX: 36.7 KG/M2 | HEIGHT: 61 IN | WEIGHT: 194.4 LBS | HEART RATE: 68 BPM | RESPIRATION RATE: 16 BRPM

## 2024-09-26 DIAGNOSIS — Z11.3 SCREEN FOR STD (SEXUALLY TRANSMITTED DISEASE): ICD-10-CM

## 2024-09-26 DIAGNOSIS — F19.11 HISTORY OF DRUG ABUSE (HCC): ICD-10-CM

## 2024-09-26 DIAGNOSIS — E66.9 OBESITY (BMI 30-39.9): ICD-10-CM

## 2024-09-26 DIAGNOSIS — Z76.89 ENCOUNTER TO ESTABLISH CARE: ICD-10-CM

## 2024-09-26 DIAGNOSIS — Z13.6 SCREENING FOR CARDIOVASCULAR CONDITION: ICD-10-CM

## 2024-09-26 DIAGNOSIS — R53.83 OTHER FATIGUE: ICD-10-CM

## 2024-09-26 DIAGNOSIS — Z83.49 FAMILY HISTORY OF THYROID DISEASE: ICD-10-CM

## 2024-09-26 PROBLEM — G89.18 POST-OP PAIN: Status: RESOLVED | Noted: 2019-08-16 | Resolved: 2024-09-26

## 2024-09-26 PROBLEM — G56.01 CARPAL TUNNEL SYNDROME ON RIGHT: Status: RESOLVED | Noted: 2017-05-26 | Resolved: 2024-09-26

## 2024-09-26 PROBLEM — Z30.2 ENCOUNTER FOR FEMALE STERILIZATION PROCEDURE: Status: RESOLVED | Noted: 2019-08-16 | Resolved: 2024-09-26

## 2024-09-26 PROCEDURE — 3078F DIAST BP <80 MM HG: CPT | Performed by: NURSE PRACTITIONER

## 2024-09-26 PROCEDURE — 99203 OFFICE O/P NEW LOW 30 MIN: CPT | Performed by: NURSE PRACTITIONER

## 2024-09-26 PROCEDURE — 3074F SYST BP LT 130 MM HG: CPT | Performed by: NURSE PRACTITIONER

## 2024-09-26 ASSESSMENT — PATIENT HEALTH QUESTIONNAIRE - PHQ9: CLINICAL INTERPRETATION OF PHQ2 SCORE: 0

## 2024-09-26 NOTE — LETTER
[facility]  VICKIE LovellPSabiRSabiN.  [address]  [place] [address]  Fax: [phone]   Authorization for Release/Disclosure of   Protected Health Information   Name: RUDDY IBANEZ : 1990 SSN: [id number]   Address: [address] [place] [address] [place] [address] Phone:    [phone] (home)    I authorize the entity listed below to release/disclose the PHI below to:   [facility]/Rosaline Da Silva A.P.R.N. and VICKIE LovellP.R.JAZZMINE.   Provider or Entity Name:  [facility]    Address   City, State, Zip   Phone:      Fax:     Reason for request: continuity of care   Information to be released:    [  ] LAST COLONOSCOPY,  including any PATH REPORT and follow-up   [  ] LAST DEXA  [  ] LAST MAMMOGRAM  [ x ] LAST PAP  [  ] LAST LABS [  ] RETINA EXAM REPORT  [  ] IMMUNIZATION RECORDS  [  ] Release all info      [  ] Check here and initial the line next to each item to release ALL health information INCLUDING  _____ Care and treatment for drug and / or alcohol abuse  _____ HIV testing, infection status, or AIDS  _____ Genetic Testing    DATES OF SERVICE OR TIME PERIOD TO BE DISCLOSED: _____________  I understand and acknowledge that:  * This Authorization may be revoked at any time by you in writing, except if your health information has already been used or disclosed.  * Your health information that will be used or disclosed as a result of you signing this authorization could be re-disclosed by the recipient. If this occurs, your re-disclosed health information may no longer be protected by State or Federal laws.  * You may refuse to sign this Authorization. Your refusal will not affect your ability to obtain treatment.  * This Authorization becomes effective upon signing and will  on (date) __________.      If no date is indicated, this Authorization will  one (1) year from the signature date.    Name: Ruddy Ibanez  Signature: Date:   2024     PLEASE FAX REQUESTED  Received phone call from [facility] lab with critical Bilirubin of 15.3. called to    RECORDS BACK TO: [phone]

## 2024-09-26 NOTE — PROGRESS NOTES
"Subjective:     Ruddy Cuellar is a 34 y.o. female presents to discuss:   Chief Complaint   Patient presents with    [facility] Med Request     GLP-1 questions      Verbal consent was acquired by the patient to use [employer] ambient listening note generation during this visit Yes     History of Present Illness  The patient presents to establish care.    She has not sought medical attention over the past 5 years.  Currently not on any daily medications.  History of IV drug abuse with methamphetamines quit in .  Denies EtOH use.  Denies tobacco use.  Overdue for routine cervical cancer screening.  Last Pap was about 5 years ago.  Denies history of abnormal Pap.    She is interested in medication for weight loss including GLP-1.  Has done some research.  She maintains an active lifestyle, drinks plenty of water, and tries to limit her carbohydrate intake. Her ideal weight is 150 pounds, which she last achieved before her pregnancy.     History of salpingectomy.  Having regular menstrual cycles.  Denies ever being diagnosed with PCOS or diabetes.    FAMILY HISTORY  Her mother and maternal aunt have thyroid issues. Her father  of lung cancer. Her maternal grandmother had high blood pressure and diabetes. Her maternal grandfather had high blood pressure and diabetes. Her cousin on her maternal aunt's side has thyroid disorder. Her paternal grandmother is alive and doing good. Her maternal grandmother had colon cancer.    ROS: : see above    No current outpatient medications on file.    No Known Allergies    Objective:     Vitals: /60   Pulse 68   Temp 36.6 °C (97.9 °F)   Resp 16   Ht 1.549 m (5' 1\")   Wt 88.2 kg (194 lb 6.4 oz)   LMP 2024 (Exact Date)   SpO2 99%   BMI 36.73 kg/m²    General: Alert, pleasant, NAD  HEENT: Normocephalic.  Neck supple.   Respiratory: no distress, no audible wheezing, RR -WNL  Heart: Regular, no murmur.  Skin: Warm, dry, no rashes.  Extremities: No " leg edema. No discoloration  Neurological: No tremors  Psych:  Affect/mood is normal, judgement is good, memory is intact, grooming is appropriate.      Assessment/Plan:      Assessment & Plan  1. Establishment of Care.  Comprehensive blood work will be ordered to assess thyroid function, electrolyte balance, liver and kidney function, cholesterol levels, A1c, B12, and CBC. A request for her Pap smear records will be made. An STD screening will be conducted. Dietary advice was provided, emphasizing the importance of hydration, sleep, and stress management. The potential side effects of GLP-1 were discussed, including GI symptoms, acute gallstones, pancreatitis, and low blood sugar. Kidney function will be evaluated prior to initiating any medication.  If labs are unremarkable for any dysfunction we can send a trial of Wegovy to her pharmacy she is going to try to utilize this under a patient assistance program.    2. Thyroid Disorder.  Given the family history of thyroid issues, thyroid function tests will be included in the comprehensive blood work.    3. Weight Management.  The patient has been advised on the importance of a balanced diet, regular physical activity, and potential use of GLP-1 medications. The necessity of checking blood work, including thyroid function and kidney function, before starting any medication was discussed.     4. Health Maintenance.  An STD screening will be conducted. A request for her Pap smear records will be made, and she is advised to schedule a Pap smear if the records indicate she is due for one.  She can follow-up for a Pap smear and we can also review her blood work at the same time.      1. Obesity (BMI 30-39.9)  - Comp Metabolic Panel; Future  - Lipid Profile; Future  - TSH WITH REFLEX TO FT4; Future  - HEMOGLOBIN A1C; Future  - Patient identified as having weight management issue.  Appropriate orders and counseling given.    2. Other fatigue  - TSH WITH REFLEX TO FT4;  Future  - CBC WITHOUT DIFFERENTIAL; Future  - VITAMIN B12; Future    3. History of drug abuse (HCC)    4. Family history of thyroid disease    5. Screening for cardiovascular condition  - Lipid Profile; Future    6. Screen for STD (sexually transmitted disease)  - HIV AG/AB COMBO ASSAY SCREENING; Future  - HEP C VIRUS ANTIBODY; Future  - RPR (SYPHILIS); Future  - Chlamydia/GC, PCR (Urine); Future    7. Encounter to establish care       Return for Lab results.    {I have placed the above orders and discussed them with an approved delegating provider. The MA is performing the below orders under the direction of Dr. Maicol BOATENG

## 2024-09-30 ENCOUNTER — HOSPITAL ENCOUNTER (OUTPATIENT)
Dept: LAB | Facility: MEDICAL CENTER | Age: 34
End: 2024-09-30
Attending: NURSE PRACTITIONER
Payer: COMMERCIAL

## 2024-09-30 DIAGNOSIS — R53.83 OTHER FATIGUE: ICD-10-CM

## 2024-09-30 DIAGNOSIS — Z13.6 SCREENING FOR CARDIOVASCULAR CONDITION: ICD-10-CM

## 2024-09-30 DIAGNOSIS — E66.9 OBESITY (BMI 30-39.9): ICD-10-CM

## 2024-09-30 DIAGNOSIS — Z11.3 SCREEN FOR STD (SEXUALLY TRANSMITTED DISEASE): ICD-10-CM

## 2024-09-30 LAB
ALBUMIN SERPL BCP-MCNC: 3.9 G/DL (ref 3.2–4.9)
ALBUMIN/GLOB SERPL: 1.2 G/DL
ALP SERPL-CCNC: 30 U/L (ref 30–99)
ALT SERPL-CCNC: 19 U/L (ref 2–50)
ANION GAP SERPL CALC-SCNC: 8 MMOL/L (ref 7–16)
AST SERPL-CCNC: 24 U/L (ref 12–45)
BILIRUB SERPL-MCNC: 0.3 MG/DL (ref 0.1–1.5)
BUN SERPL-MCNC: 15 MG/DL (ref 8–22)
CALCIUM ALBUM COR SERPL-MCNC: 9.4 MG/DL (ref 8.5–10.5)
CALCIUM SERPL-MCNC: 9.3 MG/DL (ref 8.5–10.5)
CHLORIDE SERPL-SCNC: 104 MMOL/L (ref 96–112)
CHOLEST SERPL-MCNC: 115 MG/DL (ref 100–199)
CO2 SERPL-SCNC: 24 MMOL/L (ref 20–33)
CREAT SERPL-MCNC: 0.75 MG/DL (ref 0.5–1.4)
ERYTHROCYTE [DISTWIDTH] IN BLOOD BY AUTOMATED COUNT: 42.1 FL (ref 35.9–50)
EST. AVERAGE GLUCOSE BLD GHB EST-MCNC: 105 MG/DL
GFR SERPLBLD CREATININE-BSD FMLA CKD-EPI: 105 ML/MIN/1.73 M 2
GLOBULIN SER CALC-MCNC: 3.3 G/DL (ref 1.9–3.5)
GLUCOSE SERPL-MCNC: 89 MG/DL (ref 65–99)
HBA1C MFR BLD: 5.3 % (ref 4–5.6)
HCT VFR BLD AUTO: 40.3 % (ref 37–47)
HCV AB SER QL: NORMAL
HDLC SERPL-MCNC: 48 MG/DL
HGB BLD-MCNC: 13.3 G/DL (ref 12–16)
HIV 1+2 AB+HIV1 P24 AG SERPL QL IA: NORMAL
LDLC SERPL CALC-MCNC: 58 MG/DL
MCH RBC QN AUTO: 28.8 PG (ref 27–33)
MCHC RBC AUTO-ENTMCNC: 33 G/DL (ref 32.2–35.5)
MCV RBC AUTO: 87.2 FL (ref 81.4–97.8)
PLATELET # BLD AUTO: 274 K/UL (ref 164–446)
PMV BLD AUTO: 10.8 FL (ref 9–12.9)
POTASSIUM SERPL-SCNC: 4.3 MMOL/L (ref 3.6–5.5)
PROT SERPL-MCNC: 7.2 G/DL (ref 6–8.2)
RBC # BLD AUTO: 4.62 M/UL (ref 4.2–5.4)
SODIUM SERPL-SCNC: 136 MMOL/L (ref 135–145)
T PALLIDUM AB SER QL IA: NORMAL
TRIGL SERPL-MCNC: 46 MG/DL (ref 0–149)
TSH SERPL DL<=0.005 MIU/L-ACNC: 1.13 UIU/ML (ref 0.38–5.33)
VIT B12 SERPL-MCNC: 572 PG/ML (ref 211–911)
WBC # BLD AUTO: 5.8 K/UL (ref 4.8–10.8)

## 2024-09-30 PROCEDURE — 80053 COMPREHEN METABOLIC PANEL: CPT

## 2024-09-30 PROCEDURE — 84443 ASSAY THYROID STIM HORMONE: CPT

## 2024-09-30 PROCEDURE — 87389 HIV-1 AG W/HIV-1&-2 AB AG IA: CPT

## 2024-09-30 PROCEDURE — 87491 CHLMYD TRACH DNA AMP PROBE: CPT

## 2024-09-30 PROCEDURE — 36415 COLL VENOUS BLD VENIPUNCTURE: CPT

## 2024-09-30 PROCEDURE — 87591 N.GONORRHOEAE DNA AMP PROB: CPT

## 2024-09-30 PROCEDURE — 86803 HEPATITIS C AB TEST: CPT

## 2024-09-30 PROCEDURE — 85027 COMPLETE CBC AUTOMATED: CPT

## 2024-09-30 PROCEDURE — 83036 HEMOGLOBIN GLYCOSYLATED A1C: CPT

## 2024-09-30 PROCEDURE — 82607 VITAMIN B-12: CPT

## 2024-09-30 PROCEDURE — 86780 TREPONEMA PALLIDUM: CPT

## 2024-09-30 PROCEDURE — 80061 LIPID PANEL: CPT

## 2024-10-01 LAB
C TRACH DNA SPEC QL NAA+PROBE: NEGATIVE
N GONORRHOEA DNA SPEC QL NAA+PROBE: NEGATIVE
SPECIMEN SOURCE: NORMAL

## 2024-12-09 ENCOUNTER — HOSPITAL ENCOUNTER (OUTPATIENT)
Facility: MEDICAL CENTER | Age: 34
End: 2024-12-09
Attending: NURSE PRACTITIONER
Payer: COMMERCIAL

## 2024-12-09 ENCOUNTER — OFFICE VISIT (OUTPATIENT)
Dept: MEDICAL GROUP | Facility: MEDICAL CENTER | Age: 34
End: 2024-12-09
Payer: COMMERCIAL

## 2024-12-09 VITALS
HEIGHT: 61 IN | HEART RATE: 64 BPM | OXYGEN SATURATION: 97 % | TEMPERATURE: 97.6 F | BODY MASS INDEX: 37.19 KG/M2 | DIASTOLIC BLOOD PRESSURE: 68 MMHG | RESPIRATION RATE: 16 BRPM | SYSTOLIC BLOOD PRESSURE: 114 MMHG | WEIGHT: 197 LBS

## 2024-12-09 DIAGNOSIS — Z12.4 SCREENING FOR MALIGNANT NEOPLASM OF CERVIX: ICD-10-CM

## 2024-12-09 DIAGNOSIS — Z01.419 WELL WOMAN EXAM WITH ROUTINE GYNECOLOGICAL EXAM: ICD-10-CM

## 2024-12-09 DIAGNOSIS — E66.9 OBESITY (BMI 30-39.9): ICD-10-CM

## 2024-12-09 DIAGNOSIS — Z11.51 SCREENING FOR HPV (HUMAN PAPILLOMAVIRUS): ICD-10-CM

## 2024-12-09 PROCEDURE — 87625 HPV TYPES 16 & 18 ONLY: CPT

## 2024-12-09 PROCEDURE — 3078F DIAST BP <80 MM HG: CPT | Performed by: NURSE PRACTITIONER

## 2024-12-09 PROCEDURE — 87624 HPV HI-RISK TYP POOLED RSLT: CPT

## 2024-12-09 PROCEDURE — 3074F SYST BP LT 130 MM HG: CPT | Performed by: NURSE PRACTITIONER

## 2024-12-09 PROCEDURE — 99395 PREV VISIT EST AGE 18-39: CPT | Performed by: NURSE PRACTITIONER

## 2024-12-09 PROCEDURE — 88142 CYTOPATH C/V THIN LAYER: CPT

## 2024-12-09 RX ORDER — SEMAGLUTIDE 0.25 MG/.5ML
0.25 INJECTION, SOLUTION SUBCUTANEOUS
Qty: 2 ML | Refills: 0 | Status: SHIPPED | OUTPATIENT
Start: 2024-12-09

## 2024-12-09 SDOH — ECONOMIC STABILITY: INCOME INSECURITY: HOW HARD IS IT FOR YOU TO PAY FOR THE VERY BASICS LIKE FOOD, HOUSING, MEDICAL CARE, AND HEATING?: NOT VERY HARD

## 2024-12-09 SDOH — ECONOMIC STABILITY: INCOME INSECURITY: IN THE LAST 12 MONTHS, WAS THERE A TIME WHEN YOU WERE NOT ABLE TO PAY THE MORTGAGE OR RENT ON TIME?: NO

## 2024-12-09 SDOH — HEALTH STABILITY: PHYSICAL HEALTH: ON AVERAGE, HOW MANY DAYS PER WEEK DO YOU ENGAGE IN MODERATE TO STRENUOUS EXERCISE (LIKE A BRISK WALK)?: 3 DAYS

## 2024-12-09 SDOH — ECONOMIC STABILITY: FOOD INSECURITY: WITHIN THE PAST 12 MONTHS, THE FOOD YOU BOUGHT JUST DIDN'T LAST AND YOU DIDN'T HAVE MONEY TO GET MORE.: NEVER TRUE

## 2024-12-09 SDOH — ECONOMIC STABILITY: HOUSING INSECURITY
IN THE LAST 12 MONTHS, WAS THERE A TIME WHEN YOU DID NOT HAVE A STEADY PLACE TO SLEEP OR SLEPT IN A SHELTER (INCLUDING NOW)?: NO

## 2024-12-09 SDOH — ECONOMIC STABILITY: TRANSPORTATION INSECURITY
IN THE PAST 12 MONTHS, HAS THE LACK OF TRANSPORTATION KEPT YOU FROM MEDICAL APPOINTMENTS OR FROM GETTING MEDICATIONS?: NO

## 2024-12-09 SDOH — HEALTH STABILITY: MENTAL HEALTH
STRESS IS WHEN SOMEONE FEELS TENSE, NERVOUS, ANXIOUS, OR CAN'T SLEEP AT NIGHT BECAUSE THEIR MIND IS TROUBLED. HOW STRESSED ARE YOU?: NOT AT ALL

## 2024-12-09 SDOH — ECONOMIC STABILITY: TRANSPORTATION INSECURITY
IN THE PAST 12 MONTHS, HAS LACK OF TRANSPORTATION KEPT YOU FROM MEETINGS, WORK, OR FROM GETTING THINGS NEEDED FOR DAILY LIVING?: NO

## 2024-12-09 SDOH — ECONOMIC STABILITY: FOOD INSECURITY: WITHIN THE PAST 12 MONTHS, YOU WORRIED THAT YOUR FOOD WOULD RUN OUT BEFORE YOU GOT MONEY TO BUY MORE.: NEVER TRUE

## 2024-12-09 SDOH — HEALTH STABILITY: PHYSICAL HEALTH: ON AVERAGE, HOW MANY MINUTES DO YOU ENGAGE IN EXERCISE AT THIS LEVEL?: 20 MIN

## 2024-12-09 SDOH — ECONOMIC STABILITY: TRANSPORTATION INSECURITY
IN THE PAST 12 MONTHS, HAS LACK OF RELIABLE TRANSPORTATION KEPT YOU FROM MEDICAL APPOINTMENTS, MEETINGS, WORK OR FROM GETTING THINGS NEEDED FOR DAILY LIVING?: NO

## 2024-12-09 ASSESSMENT — SOCIAL DETERMINANTS OF HEALTH (SDOH)
HOW HARD IS IT FOR YOU TO PAY FOR THE VERY BASICS LIKE FOOD, HOUSING, MEDICAL CARE, AND HEATING?: NOT VERY HARD
HOW OFTEN DO YOU HAVE A DRINK CONTAINING ALCOHOL: MONTHLY OR LESS
HOW OFTEN DO YOU ATTENT MEETINGS OF THE CLUB OR ORGANIZATION YOU BELONG TO?: NEVER
HOW OFTEN DO YOU ATTENT MEETINGS OF THE CLUB OR ORGANIZATION YOU BELONG TO?: NEVER
HOW OFTEN DO YOU HAVE SIX OR MORE DRINKS ON ONE OCCASION: NEVER
DO YOU BELONG TO ANY CLUBS OR ORGANIZATIONS SUCH AS CHURCH GROUPS UNIONS, FRATERNAL OR ATHLETIC GROUPS, OR SCHOOL GROUPS?: NO
HOW MANY DRINKS CONTAINING ALCOHOL DO YOU HAVE ON A TYPICAL DAY WHEN YOU ARE DRINKING: 1 OR 2
WITHIN THE PAST 12 MONTHS, YOU WORRIED THAT YOUR FOOD WOULD RUN OUT BEFORE YOU GOT THE MONEY TO BUY MORE: NEVER TRUE
ARE YOU MARRIED, WIDOWED, DIVORCED, SEPARATED, NEVER MARRIED, OR LIVING WITH A PARTNER?: NEVER MARRIED
IN A TYPICAL WEEK, HOW MANY TIMES DO YOU TALK ON THE PHONE WITH FAMILY, FRIENDS, OR NEIGHBORS?: MORE THAN THREE TIMES A WEEK
HOW OFTEN DO YOU ATTEND CHURCH OR RELIGIOUS SERVICES?: NEVER
HOW OFTEN DO YOU GET TOGETHER WITH FRIENDS OR RELATIVES?: ONCE A WEEK
HOW OFTEN DO YOU GET TOGETHER WITH FRIENDS OR RELATIVES?: ONCE A WEEK
HOW OFTEN DO YOU ATTEND CHURCH OR RELIGIOUS SERVICES?: NEVER
DO YOU BELONG TO ANY CLUBS OR ORGANIZATIONS SUCH AS CHURCH GROUPS UNIONS, FRATERNAL OR ATHLETIC GROUPS, OR SCHOOL GROUPS?: NO
IN A TYPICAL WEEK, HOW MANY TIMES DO YOU TALK ON THE PHONE WITH FAMILY, FRIENDS, OR NEIGHBORS?: MORE THAN THREE TIMES A WEEK
IN THE PAST 12 MONTHS, HAS THE ELECTRIC, GAS, OIL, OR WATER COMPANY THREATENED TO SHUT OFF SERVICE IN YOUR HOME?: NO
ARE YOU MARRIED, WIDOWED, DIVORCED, SEPARATED, NEVER MARRIED, OR LIVING WITH A PARTNER?: NEVER MARRIED

## 2024-12-09 ASSESSMENT — LIFESTYLE VARIABLES
HOW OFTEN DO YOU HAVE A DRINK CONTAINING ALCOHOL: MONTHLY OR LESS
AUDIT-C TOTAL SCORE: 1
SKIP TO QUESTIONS 9-10: 1
HOW OFTEN DO YOU HAVE SIX OR MORE DRINKS ON ONE OCCASION: NEVER
HOW MANY STANDARD DRINKS CONTAINING ALCOHOL DO YOU HAVE ON A TYPICAL DAY: 1 OR 2

## 2024-12-09 ASSESSMENT — FIBROSIS 4 INDEX: FIB4 SCORE: 0.72

## 2024-12-09 NOTE — PROGRESS NOTES
Subjective:     CC:   Chief Complaint   Patient presents with    Annual Exam       HPI:   Ruddy Cuellar is a 34 y.o. female who presents for Routine Preventative Exam    Last Pap Smear: More than 5 years ago  H/O Abnormal Pap: Yes  Last Mammogram: N/A  Last Bone Density Test: N/A  Last Colorectal Cancer Screening: N/A  Last Tdap: 2019  Received HPV series: No    Patient's last menstrual period was 2024 (approximate).  Hx STDs: Yes  Birth control: Salpingectomy  No significant bloating/fluid retention, pelvic pain, or dyspareunia. No abnormal vaginal discharge.  No breast tenderness, mass, nipple discharge, changes in size or contour, or abnormal cyclic discomfort.    OB History    Para Term  AB Living   6 4 4 0 2 4   SAB IAB Ectopic Molar Multiple Live Births   2 0 0 0 0 4      She  reports being sexually active and has had partner(s) who are male. She reports using the following methods of birth control/protection: Surgical and Female Sterilization.    She  has a past medical history of Substance abuse (HCC).  She  has a past surgical history that includes appendectomy (); carpal tunnel endoscopic (Bilateral, 2017); pr lap,diagnostic abdomen (N/A, 2019); and salpingectomy (Bilateral, 2019).    Family History   Problem Relation Age of Onset    Thyroid Mother     Lung Cancer Father     No Known Problems Sister     No Known Problems Brother     Thyroid Maternal Aunt     Hypertension Maternal Grandmother     Diabetes Maternal Grandmother     Colon Cancer Maternal Grandmother     Hypertension Maternal Grandfather     Diabetes Maternal Grandfather     No Known Problems Paternal Grandmother      Social History     Tobacco Use    Smoking status: Never    Smokeless tobacco: Never   Vaping Use    Vaping status: Never Used   Substance Use Topics    Alcohol use: No    Drug use: No     Types: Intravenous, Methamphetamines     Comment: quit 2015       Patient Active Problem  "List    Diagnosis Date Noted    Family history of thyroid disease 09/26/2024    History of drug abuse (HCC) 06/10/2015     Current Outpatient Medications   Medication Sig Dispense Refill    Semaglutide (WEGOVY) 0.25 MG/0.5ML Solution Auto-injector Pen-injector Inject 0.5 mL under the skin every 7 days. 2 mL 0     No current facility-administered medications for this visit.     No Known Allergies    Review of Systems   Constitutional: Negative for fever, chills and malaise/fatigue.   HENT: Negative for congestion.    Eyes: Negative for pain.   Respiratory: Negative for cough and shortness of breath.    Cardiovascular: Negative for chest pain and leg swelling.   Gastrointestinal: Negative for nausea, vomiting, abdominal pain and diarrhea.   Genitourinary: Negative for dysuria and hematuria.   Skin: Negative for rash.   Neurological: Negative for dizziness, focal weakness and headaches.   Endo/Heme/Allergies: Does not bruise/bleed easily.   Psychiatric/Behavioral: Negative for depression.  The patient is not nervous/anxious.      Objective:   /68   Pulse 64   Temp 36.4 °C (97.6 °F) (Temporal)   Resp 16   Ht 1.549 m (5' 1\")   Wt 89.4 kg (197 lb)   LMP 11/18/2024 (Approximate)   SpO2 97%   Breastfeeding No   BMI 37.22 kg/m²     Wt Readings from Last 4 Encounters:   12/09/24 89.4 kg (197 lb)   09/26/24 88.2 kg (194 lb 6.4 oz)   08/18/22 81.2 kg (179 lb 0.2 oz)   11/06/20 76.2 kg (167 lb 15.9 oz)       A chaperone was offered to the patient during today's exam. Patient declined chaperone.    Physical Exam:  Constitutional: Well-developed and well-nourished. Not diaphoretic. No distress.   Skin: Skin is warm and dry. No rash noted.  Head: Atraumatic without lesions.  Eyes: Conjunctivae and extraocular motions are normal. Pupils are equal, round, and reactive to light. No scleral icterus.   Ears:  External ears unremarkable. Tympanic membranes clear and intact.  Nose: Nares patent. Septum midline. Turbinates " without erythema nor edema. No discharge.   Mouth/Throat: Tongue normal. Oropharynx is clear and moist. Posterior pharynx without erythema or exudates.  Neck: Supple, trachea midline. Normal range of motion. No thyromegaly present. No lymphadenopathy--cervical or supraclavicular.  Cardiovascular: Regular rate and rhythm, S1 and S2 without murmur, rubs, or gallops.    Respiratory: Effort normal. Clear to auscultation throughout. No adventitious sounds.     Abdomen: Soft, non tender, and without distention. Active bowel sounds in all four quadrants. No rebound, guarding, masses or HSM.  : Perineum and external genitalia normal without rash. Vagina with copious, watery, and mucoid discharge. Cervix without visible lesions or discharge. Weak vaginal vault.Bimanual exam without adnexal masses or cervical motion tenderness.  Extremities: No cyanosis, clubbing, erythema, nor edema. Distal pulses intact and symmetric.   Musculoskeletal: All major joints AROM full in all directions without pain.  Neurological: Alert and oriented x 3. Grossly non-focal.   Psychiatric:  Behavior, mood, and affect are appropriate.    Assessment and Plan:     1. Well woman exam with routine gynecological exam  - THINPREP PAP WITH HPV; Future    2. Screening for malignant neoplasm of cervix  - THINPREP PAP WITH HPV; Future    3. Screening for HPV (human papillomavirus)  - THINPREP PAP WITH HPV; Future    4. Obesity (BMI 30-39.9)  Labs reviewed.  No evidence of diabetes.  Thyroid function normal.  Patient would like to trial GLP-1 Wegovy as previously discussed on last office visit.  Will send first month low dose to pharmacy to see if her insurance would include this coverage.  - Semaglutide (WEGOVY) 0.25 MG/0.5ML Solution Auto-injector Pen-injector; Inject 0.5 mL under the skin every 7 days.  Dispense: 2 mL; Refill: 0      Health maintenance:  -Labs per orders-  -Immunizations per orders-  -Patient counseled about skin care, diet, supplements,  and exercise, self care.   -Discussed  breast self exam, STD prevention, adequate intake of calcium and vitamin D, colorectal cancer screening     Smoking: recommend complete avoidance of all tobacco products  Alcohol: recommend limiting consumption  Physical Activity: goal is 30 min of moderate activity 5 times a week  Weight Management and Nutrition: high vegetable, high protein diet like the Mediterranean diet, portion control, avoid excessive sugars.    Follow-up: Return for Annual Preventative Exam.    Rosaline FARLEY

## 2024-12-14 LAB
HPV I/H RISK 1 DNA SPEC QL NAA+PROBE: DETECTED
HPV16 DNA CVX QL PROBE+SIG AMP: NOT DETECTED
HPV18 DNA CVX QL PROBE+SIG AMP: NOT DETECTED
SPECIMEN SOURCE: ABNORMAL
SPECIMEN SOURCE: NORMAL
THINPREP PAP, CYTOLOGY NL11781: NORMAL

## 2025-08-19 ENCOUNTER — OFFICE VISIT (OUTPATIENT)
Dept: MEDICAL GROUP | Facility: MEDICAL CENTER | Age: 35
End: 2025-08-19
Payer: COMMERCIAL

## 2025-08-19 VITALS
HEART RATE: 76 BPM | HEIGHT: 61 IN | TEMPERATURE: 97.4 F | OXYGEN SATURATION: 98 % | DIASTOLIC BLOOD PRESSURE: 74 MMHG | WEIGHT: 195.4 LBS | RESPIRATION RATE: 18 BRPM | SYSTOLIC BLOOD PRESSURE: 108 MMHG | BODY MASS INDEX: 36.89 KG/M2

## 2025-08-19 DIAGNOSIS — Z71.3 WEIGHT LOSS COUNSELING, ENCOUNTER FOR: ICD-10-CM

## 2025-08-19 DIAGNOSIS — E66.9 OBESITY (BMI 30-39.9): Primary | ICD-10-CM

## 2025-08-19 PROCEDURE — 3078F DIAST BP <80 MM HG: CPT | Performed by: NURSE PRACTITIONER

## 2025-08-19 PROCEDURE — 3074F SYST BP LT 130 MM HG: CPT | Performed by: NURSE PRACTITIONER

## 2025-08-19 PROCEDURE — 99213 OFFICE O/P EST LOW 20 MIN: CPT | Performed by: NURSE PRACTITIONER

## 2025-08-19 RX ORDER — TIRZEPATIDE 2.5 MG/.5ML
0.5 INJECTION, SOLUTION SUBCUTANEOUS
Qty: 2 ML | Refills: 0 | Status: SHIPPED | OUTPATIENT
Start: 2025-08-19 | End: 2025-08-22 | Stop reason: SDUPTHER

## 2025-08-19 ASSESSMENT — FIBROSIS 4 INDEX: FIB4 SCORE: 0.74

## 2025-08-19 ASSESSMENT — PATIENT HEALTH QUESTIONNAIRE - PHQ9: CLINICAL INTERPRETATION OF PHQ2 SCORE: 0

## 2025-08-22 RX ORDER — TIRZEPATIDE 2.5 MG/.5ML
0.5 INJECTION, SOLUTION SUBCUTANEOUS
Qty: 2 ML | Refills: 0 | Status: SHIPPED | OUTPATIENT
Start: 2025-08-22 | End: 2025-08-22 | Stop reason: SDUPTHER

## 2025-08-22 RX ORDER — TIRZEPATIDE 2.5 MG/.5ML
0.5 INJECTION, SOLUTION SUBCUTANEOUS
Qty: 2 ML | Refills: 0 | Status: SHIPPED | OUTPATIENT
Start: 2025-08-22 | End: 2025-09-21

## 2025-08-22 RX ORDER — TIRZEPATIDE 2.5 MG/.5ML
0.5 INJECTION, SOLUTION SUBCUTANEOUS
Qty: 2 ML | Refills: 0 | Status: SHIPPED
Start: 2025-08-22 | End: 2025-08-22 | Stop reason: SDUPTHER

## (undated) DEVICE — CATHETER IV 20 GA X 1-1/4 ---SURG.& SDS ONLY--- (50EA/BX)

## (undated) DEVICE — KIT  I.V. START (100EA/CA)

## (undated) DEVICE — BANDAID SHEER STRIP 3/4 IN (100EA/BX 12BX/CA)

## (undated) DEVICE — PACK LAPAROSCOPY - (1/CA)

## (undated) DEVICE — NEEDLE NON SAFETY 25 GA X 1 1/2 IN HYPO (100EA/BX)

## (undated) DEVICE — LACTATED RINGERS INJ 1000 ML - (14EA/CA 60CA/PF)

## (undated) DEVICE — CANISTER SUCTION RIGID RED 1500CC (40EA/CA)

## (undated) DEVICE — SUCTION INSTRUMENT YANKAUER BULBOUS TIP W/O VENT (50EA/CA)

## (undated) DEVICE — SENSOR SPO2 NEO LNCS ADHESIVE (20/BX) SEE USER NOTES

## (undated) DEVICE — PADDING CAST 4 IN STERILE - 4 X 4 YDS (24/CA)

## (undated) DEVICE — GLOVE SZ 6 BIOGEL PI MICRO - PF LF (50PR/BX 4BX/CA)

## (undated) DEVICE — MANIFOLD NEPTUNE 1 PORT (20/PK)

## (undated) DEVICE — KIT ANESTHESIA W/CIRCUIT & 3/LT BAG W/FILTER (20EA/CA)

## (undated) DEVICE — HEAD HOLDER JUNIOR/ADULT

## (undated) DEVICE — NEPTUNE 4 PORT MANIFOLD - (20/PK)

## (undated) DEVICE — ARMREST CRADLE FOAM - (2PR/PK 12PR/CA)

## (undated) DEVICE — KIT CARPAL TUNNEL ENDOSCOPIC

## (undated) DEVICE — GLOVE SZ 6.5 BIOGEL PI MICRO - PF LF (50PR/BX)

## (undated) DEVICE — TROCAR LAPSCP 100MM 12MM NTHRD - (6/BX)

## (undated) DEVICE — SODIUM CHL IRRIGATION 0.9% 1000ML (12EA/CA)

## (undated) DEVICE — GOWN WARMING STANDARD FLEX - (30/CA)

## (undated) DEVICE — ELECTRODE DUAL RETURN W/ CORD - (50/PK)

## (undated) DEVICE — TUBING CLEARLINK DUO-VENT - C-FLO (48EA/CA)

## (undated) DEVICE — PROTECTOR ULNA NERVE - (36PR/CA)

## (undated) DEVICE — GLOVE BIOGEL INDICATOR SZ 7.5 SURGICAL PF LTX - (50PR/BX 4BX/CA)

## (undated) DEVICE — TOURNIQUET, STERILE 18 (RED)

## (undated) DEVICE — TUBE CONNECTING SUCTION - CLEAR PLASTIC STERILE 72 IN (50EA/CA)

## (undated) DEVICE — SUTURE GENERAL

## (undated) DEVICE — SUTURE 4-0 VICRYL PLUS FS-2 - 27 INCH (36/BX)

## (undated) DEVICE — ELECTRODE 850 FOAM ADHESIVE - HYDROGEL RADIOTRNSPRNT (50/PK)

## (undated) DEVICE — MASK ANESTHESIA ADULT  - (100/CA)

## (undated) DEVICE — SLEEVE, VASO, THIGH, MED

## (undated) DEVICE — SYRINGE SAFETY 3 ML 18 GA X 1 1/2 BLUNT LL (100/BX 8BX/CA)

## (undated) DEVICE — WATER IRRIGATION STERILE 1000ML (12EA/CA)

## (undated) DEVICE — TROCAR5X55 KII SHIELDED SYS - (6/BX)

## (undated) DEVICE — BANDAID X-LARGE 2 X 4 IN LF (50EA/BX)

## (undated) DEVICE — SET LEADWIRE 5 LEAD BEDSIDE DISPOSABLE ECG (1SET OF 5/EA)

## (undated) DEVICE — ENDO SHEARS LONG - (6EA/CA)

## (undated) DEVICE — LEAD SET 6 DISP. EKG NIHON KOHDEN (100EA/CA)

## (undated) DEVICE — GLOVE BIOGEL SZ 6.5 SURGICAL PF LTX (50PR/BX 4BX/CA)

## (undated) DEVICE — GLOVE BIOGEL SZ 7 SURGICAL PF LTX - (50PR/BX 4BX/CA)

## (undated) DEVICE — SUTURE 4-0 ETHILON FS-2 18 (36PK/BX)"

## (undated) DEVICE — CHLORAPREP 26 ML APPLICATOR - ORANGE TINT(25/CA)

## (undated) DEVICE — STOCKINET BIAS 4 IN STERILE - (20/CA)

## (undated) DEVICE — TUBING SETDISPOS HIGH FLOW II - (10/BX)

## (undated) DEVICE — SUTURE 0 VICRYL PLUS CT-2 - 27 INCH (36/BX)

## (undated) DEVICE — PAD SANITARY 11IN MAXI IND WRAPPED  (12EA/PK 24PK/CA)

## (undated) DEVICE — SLING ORTH UNV TIETX VLFM ARM

## (undated) DEVICE — TRAY FOLEY CATHETER STATLOCK 16FR SURESTEP  (10EA/CA)

## (undated) DEVICE — GLOVE BIOGEL SZ 7.5 SURGICAL PF LTX - (50PR/BX 4BX/CA)

## (undated) DEVICE — CANISTER SUCTION 3000ML MECHANICAL FILTER AUTO SHUTOFF MEDI-VAC NONSTERILE LF DISP  (40EA/CA)

## (undated) DEVICE — PACK LOWER EXTREMITY - (2/CA)

## (undated) DEVICE — SOD. CHL. INJ. 0.9% 250 ML - (36/CA 50CA/PF)

## (undated) DEVICE — GOWN SURGEONS X-LARGE - DISP. (30/CA)

## (undated) DEVICE — TRAY SRGPRP PVP IOD WT PRP - (20/CA)

## (undated) DEVICE — GLOVE BIOGEL PI INDICATOR SZ 6.5 SURGICAL PF LF - (50/BX 4BX/CA)